# Patient Record
Sex: MALE | Race: WHITE | NOT HISPANIC OR LATINO
[De-identification: names, ages, dates, MRNs, and addresses within clinical notes are randomized per-mention and may not be internally consistent; named-entity substitution may affect disease eponyms.]

---

## 2017-03-20 ENCOUNTER — APPOINTMENT (OUTPATIENT)
Dept: INTERNAL MEDICINE | Facility: CLINIC | Age: 77
End: 2017-03-20

## 2017-08-31 ENCOUNTER — OTHER (OUTPATIENT)
Age: 77
End: 2017-08-31

## 2017-09-05 ENCOUNTER — OTHER (OUTPATIENT)
Age: 77
End: 2017-09-05

## 2017-09-11 ENCOUNTER — APPOINTMENT (OUTPATIENT)
Dept: INTERNAL MEDICINE | Facility: CLINIC | Age: 77
End: 2017-09-11
Payer: MEDICARE

## 2017-09-11 VITALS
SYSTOLIC BLOOD PRESSURE: 130 MMHG | WEIGHT: 146 LBS | DIASTOLIC BLOOD PRESSURE: 76 MMHG | TEMPERATURE: 97.9 F | HEART RATE: 64 BPM | HEIGHT: 68 IN | BODY MASS INDEX: 22.13 KG/M2 | OXYGEN SATURATION: 95 % | RESPIRATION RATE: 18 BRPM

## 2017-09-11 PROCEDURE — 94729 DIFFUSING CAPACITY: CPT

## 2017-09-11 PROCEDURE — 94060 EVALUATION OF WHEEZING: CPT

## 2017-09-11 PROCEDURE — 99214 OFFICE O/P EST MOD 30 MIN: CPT | Mod: 25

## 2017-09-11 PROCEDURE — 94727 GAS DIL/WSHOT DETER LNG VOL: CPT

## 2017-12-05 LAB
CHOLEST SERPL-MCNC: 145
CHOLEST SERPL-MCNC: 145
GLUCOSE SERPL-MCNC: 86
GLUCOSE SERPL-MCNC: 86
HBA1C MFR BLD HPLC: 5.4
HBA1C MFR BLD HPLC: 5.4
HDLC SERPL-MCNC: 74
HDLC SERPL-MCNC: 74
LDLC SERPL CALC-MCNC: 63
LDLC SERPL CALC-MCNC: 63
PSA SERPL-MCNC: 0.86

## 2018-03-19 ENCOUNTER — APPOINTMENT (OUTPATIENT)
Dept: INTERNAL MEDICINE | Facility: CLINIC | Age: 78
End: 2018-03-19
Payer: MEDICARE

## 2018-03-19 VITALS
WEIGHT: 145 LBS | RESPIRATION RATE: 18 BRPM | OXYGEN SATURATION: 98 % | HEIGHT: 68 IN | HEART RATE: 70 BPM | BODY MASS INDEX: 21.98 KG/M2 | SYSTOLIC BLOOD PRESSURE: 130 MMHG | DIASTOLIC BLOOD PRESSURE: 78 MMHG | TEMPERATURE: 98.3 F

## 2018-03-19 PROCEDURE — 94729 DIFFUSING CAPACITY: CPT

## 2018-03-19 PROCEDURE — 99214 OFFICE O/P EST MOD 30 MIN: CPT | Mod: 25

## 2018-03-19 PROCEDURE — 94060 EVALUATION OF WHEEZING: CPT

## 2018-04-02 ENCOUNTER — MEDICATION RENEWAL (OUTPATIENT)
Age: 78
End: 2018-04-02

## 2018-04-02 ENCOUNTER — RX RENEWAL (OUTPATIENT)
Age: 78
End: 2018-04-02

## 2018-09-09 ENCOUNTER — RESULT CHARGE (OUTPATIENT)
Age: 78
End: 2018-09-09

## 2018-09-10 ENCOUNTER — LABORATORY RESULT (OUTPATIENT)
Age: 78
End: 2018-09-10

## 2018-09-10 ENCOUNTER — NON-APPOINTMENT (OUTPATIENT)
Age: 78
End: 2018-09-10

## 2018-09-10 ENCOUNTER — APPOINTMENT (OUTPATIENT)
Dept: INTERNAL MEDICINE | Facility: CLINIC | Age: 78
End: 2018-09-10
Payer: MEDICARE

## 2018-09-10 VITALS
HEART RATE: 60 BPM | RESPIRATION RATE: 18 BRPM | TEMPERATURE: 98.2 F | HEIGHT: 68 IN | SYSTOLIC BLOOD PRESSURE: 146 MMHG | BODY MASS INDEX: 21.22 KG/M2 | DIASTOLIC BLOOD PRESSURE: 70 MMHG | OXYGEN SATURATION: 95 % | WEIGHT: 139.99 LBS

## 2018-09-10 DIAGNOSIS — F52.21 MALE ERECTILE DISORDER: ICD-10-CM

## 2018-09-10 DIAGNOSIS — Z87.891 PERSONAL HISTORY OF NICOTINE DEPENDENCE: ICD-10-CM

## 2018-09-10 DIAGNOSIS — Z78.9 OTHER SPECIFIED HEALTH STATUS: ICD-10-CM

## 2018-09-10 DIAGNOSIS — Q82.5 CONGENITAL NON-NEOPLASTIC NEVUS: ICD-10-CM

## 2018-09-10 DIAGNOSIS — R26.9 UNSPECIFIED ABNORMALITIES OF GAIT AND MOBILITY: ICD-10-CM

## 2018-09-10 DIAGNOSIS — E72.19 OTHER DISORDERS OF SULFUR-BEARING AMINO-ACID METABOLISM: ICD-10-CM

## 2018-09-10 DIAGNOSIS — Z86.19 PERSONAL HISTORY OF OTHER INFECTIOUS AND PARASITIC DISEASES: ICD-10-CM

## 2018-09-10 DIAGNOSIS — K44.9 DIAPHRAGMATIC HERNIA W/OUT OBSTRUCTION OR GANGRENE: ICD-10-CM

## 2018-09-10 DIAGNOSIS — Z87.19 PERSONAL HISTORY OF OTHER DISEASES OF THE DIGESTIVE SYSTEM: ICD-10-CM

## 2018-09-10 DIAGNOSIS — B44.89 OTHER FORMS OF ASPERGILLOSIS: ICD-10-CM

## 2018-09-10 DIAGNOSIS — Z80.1 FAMILY HISTORY OF MALIGNANT NEOPLASM OF TRACHEA, BRONCHUS AND LUNG: ICD-10-CM

## 2018-09-10 DIAGNOSIS — E03.9 HYPOTHYROIDISM, UNSPECIFIED: ICD-10-CM

## 2018-09-10 DIAGNOSIS — R42 DIZZINESS AND GIDDINESS: ICD-10-CM

## 2018-09-10 PROCEDURE — 94010 BREATHING CAPACITY TEST: CPT

## 2018-09-10 PROCEDURE — 93000 ELECTROCARDIOGRAM COMPLETE: CPT

## 2018-09-10 PROCEDURE — 99214 OFFICE O/P EST MOD 30 MIN: CPT | Mod: 25

## 2018-09-10 RX ORDER — CHOLECALCIFEROL (VITAMIN D3) 50 MCG
50 MCG CAPSULE ORAL
Refills: 0 | Status: ACTIVE | COMMUNITY

## 2018-09-10 RX ORDER — GUAIFENESIN 600 MG/1
600 TABLET, EXTENDED RELEASE ORAL
Refills: 11 | Status: COMPLETED | COMMUNITY
Start: 1900-01-01 | End: 2019-09-05

## 2018-09-10 NOTE — HEALTH RISK ASSESSMENT
[No falls in past year] : Patient reported no falls in the past year [0] : 1) Little interest or pleasure doing things: Not at all (0) [1] : 2) Feeling down, depressed, or hopeless for several days (1) [[id number]] : 1

## 2018-09-10 NOTE — PLAN
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 He must record pulse with BP when checked.\par To ER for serious sxs.\par F/U here in 3 weeks or sooner PRN.\par

## 2018-09-10 NOTE — REVIEW OF SYSTEMS
[Fever] : no fever [Chills] : no chills [Fatigue] : fatigue [Hot Flashes] : no hot flashes [Night Sweats] : no night sweats [Recent Change In Weight] : ~T recent weight change [Discharge] : no discharge [Pain] : no pain [Vision Problems] : no vision problems [Nosebleeds] : no nosebleeds [Postnasal Drip] : no postnasal drip [Nasal Discharge] : no nasal discharge [Sore Throat] : no sore throat [Hoarseness] : no hoarseness [Chest Pain] : no chest pain [Palpitations] : palpitations [Claudication] : no  leg claudication [Lower Ext Edema] : no lower extremity edema [Orthopena] : no orthopnea [Paroysmal Nocturnal Dyspnea] : no paroysmal nocturnal dyspnea [Shortness Of Breath] : no shortness of breath [Wheezing] : no wheezing [Cough] : cough [Dyspnea on Exertion] : dyspnea on exertion [Abdominal Pain] : no abdominal pain [Nausea] : no nausea [Constipation] : no constipation [Diarrhea] : no diarrhea [Heartburn] : no heartburn [Melena] : no melena [Dysuria] : no dysuria [Incontinence] : no incontinence [Hematuria] : no hematuria [Joint Pain] : no joint pain [Joint Stiffness] : joint stiffness [Muscle Pain] : no muscle pain [Back Pain] : no back pain [Joint Swelling] : no joint swelling [Itching] : no itching [Skin Rash] : no skin rash [Headache] : no headache [Dizziness] : no dizziness [Fainting] : no fainting [Confusion] : no confusion [Unsteady Walk] : ataxia [Memory Loss] : no memory loss [Suicidal] : not suicidal [Anxiety] : no anxiety [Depression] : no depression [Easy Bleeding] : easy bleeding [Swollen Glands] : no swollen glands [Negative] : Heme/Lymph

## 2018-09-10 NOTE — COUNSELING
[Weight management counseling provided] : Weight management [Healthy eating counseling provided] : healthy eating [Activity counseling provided] : activity [Walking] : Walking [Good understanding] : Patient has a good understanding of lifestyle changes and the steps needed to achieve self management goals [de-identified] : Fall precautions reviewed.

## 2018-09-10 NOTE — HISTORY OF PRESENT ILLNESS
[FreeTextEntry8] : "I've been off balance."\par \par The patient denies HX of vertigo but has HX of orthostatic LH and reports 2 episodes of being "off balance" for 3 day period. He awoke in the AM of 8-4-18 and "had trouble finding the floor." There was no room spinning dizziness but was imbalanced and having difficulty walking straight. "I had to hold the wall to walk." This was present for 3 days with variable intensity and resolved spontaneously on day 3. He was unable to drive to work on the first day. BP ranged from 103/53 to 115/62 over 3 days but he did not record pulse rate. Sxs resolved without intervention but returned from 9-1 to 9-3-18. This time sxs began in the PM on 9-1-18 and associated with mild fatigue. He has not fallen and there was no syncope. BP reported as 105/63 to 124/79. \par Vision was not changed and he denies having chest pain, palpitation, abd pain, nausea, diarrhea, emesis, melena or BRBPR. He is unclear if there were palpitations but nothing sustained. He had mild cough and stable NICHOLS during episodes. There is no worsening LE edema, orthopnea or PND. He did not have fever, chills or rash. He lost 5 pounds since last visit but "This is normal for me in the summer." He states he is compliant with cardiology and ENT surgical follow up.

## 2018-09-10 NOTE — PHYSICAL EXAM
[No Acute Distress] : no acute distress [Well Developed] : well developed [Normal Sclera/Conjunctiva] : normal sclera/conjunctiva [PERRL] : pupils equal round and reactive to light [EOMI] : extraocular movements intact [Normal Outer Ear/Nose] : the outer ears and nose were normal in appearance [Normal Oropharynx] : the oropharynx was normal [Normal TMs] : both tympanic membranes were normal [Normal Nasal Mucosa] : the nasal mucosa was normal [No JVD] : no jugular venous distention [Supple] : supple [No Lymphadenopathy] : no lymphadenopathy [Thyroid Normal, No Nodules] : the thyroid was normal and there were no nodules present [No Respiratory Distress] : no respiratory distress  [No Accessory Muscle Use] : no accessory muscle use [Normal Rate] : normal rate  [Regular Rhythm] : with a regular rhythm [Normal S1, S2] : normal S1 and S2 [No Carotid Bruits] : no carotid bruits [No Varicosities] : no varicosities [No Edema] : there was no peripheral edema [No Extremity Clubbing/Cyanosis] : no extremity clubbing/cyanosis [Soft] : abdomen soft [Non Tender] : non-tender [No HSM] : no HSM [Normal Bowel Sounds] : normal bowel sounds [Normal Supraclavicular Nodes] : no supraclavicular lymphadenopathy [Normal Anterior Cervical Nodes] : no anterior cervical lymphadenopathy [No Spinal Tenderness] : no spinal tenderness [Kyphosis] : kyphosis [Scoliosis] : no scoliosis [No Joint Swelling] : no joint swelling [Grossly Normal Strength/Tone] : grossly normal strength/tone [No Rash] : no rash [Normal Gait] : normal gait [Coordination Grossly Intact] : coordination grossly intact [No Focal Deficits] : no focal deficits [Speech Grossly Normal] : speech grossly normal [Memory Grossly Normal] : memory grossly normal [Normal Affect] : the affect was normal [Alert and Oriented x3] : oriented to person, place, and time [Normal Mood] : the mood was normal [Normal Insight/Judgement] : insight and judgment were intact [de-identified] : Hoarse voice, thin and chronically ill appearing. [de-identified] : non obstructing cerumen right AC [de-identified] : no stridor or mass. [de-identified] : diffusely diminished and hyperresonant. No wheeze or crackles. [de-identified] : 1/6 SAUMYA and occ extrasystole [de-identified] : multiple ecchymoses

## 2018-09-10 NOTE — DATA REVIEWED
[FreeTextEntry1] : EKG is performed. NSR at 61 bpm, normal axis, IVCD, micro Q in III and AVF, good R wave progression V1-6 with diffuse J point ST segments. No change c/w 11-9-15 EKG.\par \par A pre-and post spirogram was performed today. This reveals mild restriction with severe obstruction and mild airway reactivity.\par

## 2018-09-11 ENCOUNTER — RESULT REVIEW (OUTPATIENT)
Age: 78
End: 2018-09-11

## 2018-09-11 ENCOUNTER — CHART COPY (OUTPATIENT)
Age: 78
End: 2018-09-11

## 2018-10-01 ENCOUNTER — APPOINTMENT (OUTPATIENT)
Dept: INTERNAL MEDICINE | Facility: CLINIC | Age: 78
End: 2018-10-01
Payer: MEDICARE

## 2018-10-01 ENCOUNTER — NON-APPOINTMENT (OUTPATIENT)
Age: 78
End: 2018-10-01

## 2018-10-01 VITALS
OXYGEN SATURATION: 95 % | RESPIRATION RATE: 16 BRPM | WEIGHT: 142 LBS | HEIGHT: 68 IN | TEMPERATURE: 98.2 F | DIASTOLIC BLOOD PRESSURE: 80 MMHG | BODY MASS INDEX: 21.52 KG/M2 | SYSTOLIC BLOOD PRESSURE: 132 MMHG | HEART RATE: 70 BPM

## 2018-10-01 PROCEDURE — 99214 OFFICE O/P EST MOD 30 MIN: CPT | Mod: 25

## 2018-10-01 PROCEDURE — 94060 EVALUATION OF WHEEZING: CPT

## 2018-10-17 ENCOUNTER — RESULT REVIEW (OUTPATIENT)
Age: 78
End: 2018-10-17

## 2019-04-08 ENCOUNTER — APPOINTMENT (OUTPATIENT)
Dept: INTERNAL MEDICINE | Facility: CLINIC | Age: 79
End: 2019-04-08

## 2019-04-10 ENCOUNTER — MEDICATION RENEWAL (OUTPATIENT)
Age: 79
End: 2019-04-10

## 2019-07-10 ENCOUNTER — APPOINTMENT (OUTPATIENT)
Dept: INTERNAL MEDICINE | Facility: CLINIC | Age: 79
End: 2019-07-10
Payer: MEDICARE

## 2019-07-10 ENCOUNTER — NON-APPOINTMENT (OUTPATIENT)
Age: 79
End: 2019-07-10

## 2019-07-10 VITALS
DIASTOLIC BLOOD PRESSURE: 70 MMHG | BODY MASS INDEX: 21.22 KG/M2 | OXYGEN SATURATION: 96 % | HEIGHT: 68 IN | RESPIRATION RATE: 18 BRPM | TEMPERATURE: 98.1 F | HEART RATE: 60 BPM | WEIGHT: 140 LBS | SYSTOLIC BLOOD PRESSURE: 140 MMHG

## 2019-07-10 DIAGNOSIS — K22.70 BARRETT'S ESOPHAGUS W/OUT DYSPLASIA: ICD-10-CM

## 2019-07-10 DIAGNOSIS — D63.8 ANEMIA IN OTHER CHRONIC DISEASES CLASSIFIED ELSEWHERE: ICD-10-CM

## 2019-07-10 PROCEDURE — 99215 OFFICE O/P EST HI 40 MIN: CPT | Mod: 25

## 2019-07-10 PROCEDURE — ZZZZZ: CPT

## 2019-07-10 PROCEDURE — 94729 DIFFUSING CAPACITY: CPT

## 2019-07-10 PROCEDURE — 94060 EVALUATION OF WHEEZING: CPT

## 2019-07-10 PROCEDURE — 93000 ELECTROCARDIOGRAM COMPLETE: CPT

## 2019-07-10 PROCEDURE — 94727 GAS DIL/WSHOT DETER LNG VOL: CPT

## 2019-08-01 NOTE — DATA REVIEWED
[FreeTextEntry1] : A pulmonary function test is performed. Lung volumes including the total lung capacity, vital capacity, and residual volume are all moderately reduced. The FRC is mildly reduced. Lung mechanics reveal a significant decrease in flow rates with minimal bronchodilator reactivity demonstrated. The DLCO is mildly reduced at 61%. Saturation is 96%. This represents a significant degree of obstructive lung disease. The constellation of the marked reduction in flow rates, the minimal degree of bronchodilator reactivity, and a decrease in the DLCO, is consistent with anatomic emphysema. Saturation is maintained.\par \par EKG shows sinus rhythm at a rate of 71. There are normal intervals and axis. There were no acute ST-T wave changes noted.\par \par Blood work from 7/31/19 is reviewed.

## 2019-08-01 NOTE — PHYSICAL EXAM
[No Acute Distress] : no acute distress [Well Developed] : well developed [Normal Sclera/Conjunctiva] : normal sclera/conjunctiva [PERRL] : pupils equal round and reactive to light [Normal Oropharynx] : the oropharynx was normal [No JVD] : no jugular venous distention [Supple] : supple [Thyroid Normal, No Nodules] : the thyroid was normal and there were no nodules present [No Respiratory Distress] : no respiratory distress  [Clear to Auscultation] : lungs were clear to auscultation bilaterally [No Accessory Muscle Use] : no accessory muscle use [Normal Rate] : normal rate  [Normal S1, S2] : normal S1 and S2 [Regular Rhythm] : with a regular rhythm [No Edema] : there was no peripheral edema [No Extremity Clubbing/Cyanosis] : no extremity clubbing/cyanosis [Soft] : abdomen soft [Non Tender] : non-tender [Normal Bowel Sounds] : normal bowel sounds [No HSM] : no HSM [Normal Supraclavicular Nodes] : no supraclavicular lymphadenopathy [Normal Posterior Cervical Nodes] : no posterior cervical lymphadenopathy [Normal Anterior Cervical Nodes] : no anterior cervical lymphadenopathy [Kyphosis] : kyphosis [No Spinal Tenderness] : no spinal tenderness [No Joint Swelling] : no joint swelling [Grossly Normal Strength/Tone] : grossly normal strength/tone [No Rash] : no rash [Normal Gait] : normal gait [Coordination Grossly Intact] : coordination grossly intact [No Focal Deficits] : no focal deficits [Normal Affect] : the affect was normal [Alert and Oriented x3] : oriented to person, place, and time [Normal Insight/Judgement] : insight and judgment were intact [Scoliosis] : no scoliosis [de-identified] : non obstructing cerumen right AC [de-identified] : Hoarse voice, thin and chronically ill appearing. [de-identified] : diffusely diminished and hyperresonant. No wheeze or crackles. [de-identified] : no stridor or mass. [de-identified] : 1/6 SAUMYA and occ extrasystole [de-identified] : multiple ecchymoses

## 2019-08-01 NOTE — PLAN
[FreeTextEntry1] : 1. Continued medication as outlined above.\par \par 2. Routine fasting blood work to be performed.\par \par 3. The patient will undergo a repeat CAT scan of the chest at this time which will be compared to prior studies. Of note is the fact that the patient did undergo a right upper lobectomy for a benign process. His postoperative course was complicated by a protracted air leak.\par \par 4. The patient has been referred back to Dr. Ortiz for a cardiac reevaluation as well as for the purpose of performing carotid duplex Doppler studies.\par \par 5. Urologic followup with Dr. Johnson next week.\par \par 6. The patient will be pretreated with prednisone which she will take 40 mg one day prior to his colonoscopy and endoscopy, and 20 mg on the day of the procedure.\par \par 7. Follow up with myself in 6 months with a wellness evaluation.\par \par Addendum:  Blood work has been reviewed. There are no absolute contraindications to the preplanned procedure.

## 2019-08-01 NOTE — HISTORY OF PRESENT ILLNESS
[de-identified] : The patient comes in today for a preoperative medical clearance and followup evaluation.\par \par Patient states that he has been feeling fairly well recently. He does have significant advanced obstructive lung disease. He has been maintained on a regimen of Spiriva and Pulmicort with fairly good results clinically. She remains active. He denies any cough or hemoptysis. He does usually produce sputum on a daily basis, for which he does take Mucinex to help thin secretions.\par \par The patient states that he is compliant with his omeprazole on a daily basis. He is scheduled to undergo an upper endoscopy to reevaluate his known history of Henderson's esophagus. His appetite has been good. There has been no change in bowel habits. He denies chest pains. There are no fevers chills or night sweats. He now comes in for this assessment.

## 2019-08-12 ENCOUNTER — MEDICATION RENEWAL (OUTPATIENT)
Age: 79
End: 2019-08-12

## 2019-08-12 RX ORDER — ALBUTEROL SULFATE 4 MG/1
4 TABLET, FILM COATED, EXTENDED RELEASE ORAL
Qty: 180 | Refills: 3 | Status: DISCONTINUED | COMMUNITY
End: 2019-08-12

## 2019-12-26 DIAGNOSIS — I65.23 OCCLUSION AND STENOSIS OF BILATERAL CAROTID ARTERIES: ICD-10-CM

## 2020-01-31 ENCOUNTER — APPOINTMENT (OUTPATIENT)
Dept: INTERNAL MEDICINE | Facility: CLINIC | Age: 80
End: 2020-01-31
Payer: MEDICARE

## 2020-01-31 VITALS
WEIGHT: 144 LBS | SYSTOLIC BLOOD PRESSURE: 130 MMHG | BODY MASS INDEX: 21.82 KG/M2 | OXYGEN SATURATION: 95 % | HEART RATE: 72 BPM | RESPIRATION RATE: 16 BRPM | DIASTOLIC BLOOD PRESSURE: 70 MMHG | HEIGHT: 68 IN | TEMPERATURE: 97.7 F

## 2020-01-31 PROCEDURE — G0438: CPT

## 2020-01-31 NOTE — HISTORY OF PRESENT ILLNESS
[de-identified] : This patient comes in today for a wellness evaluation.\par \par Overall, the patient states that he is doing well. He has a history of severe COPD. He remains compliant with his bronchodilator regimen for management. He does not follow a formal exercise regimen. His last CT scan of his chest was performed in July 2019. The results indicated severe pulmonary emphysema, bullous disease, and postoperative and pleural changes. These results are not different compared to the prior studies. He continues to occasionally produce clear sputum in the mornings. He uses mucinex if he feels the mucous is too thick. He denies any recent COPD exacerbations, wheezing, SOB, purulent nasal secretions.\par \par The patient has a history of HLD. He remains compliant with his atorvastatin for management. He is followed by Dr. Ortiz. He had a echo, stress test, and carotid duplex study performed this past November. He denies CP, tightness, and palpitations.\par \par He has GERD. He remains compliant with his omeprazole for management. He denies changes in his diet, appetite, and bowel habits.\par \par He is followed by Dr. Perrin one a yearly basis due to his history of laryngeal cancer. He has been treated with radiation therapy in the past.\par \par He denies fevers, chills, night sweats, and any other constitutional symptoms. He now comes in for this assessment.

## 2020-01-31 NOTE — END OF VISIT
[FreeTextEntry3] : All medical record entries made by the scribe were at my, Dr. Lion Rodriguez, direction and personally dictated by me on 01/31/2020. I have reviewed the chart and agree that the record accurately reflects my personal performance of the history, physical exam, assessment and plan. I have also personally directed, reviewed, and agreed with the chart.

## 2020-01-31 NOTE — ADDENDUM
[FreeTextEntry1] : I, Ronaldo Alston, acted solely as a scribe for Dr. Lion Rodriguez on this date 01/31/2020.

## 2020-01-31 NOTE — HEALTH RISK ASSESSMENT
[Yes] : Yes [No] : In the past 12 months have you used drugs other than those required for medical reasons? No [0] : 2) Feeling down, depressed, or hopeless: Not at all (0) [Smoke Detector] : smoke detector [Carbon Monoxide Detector] : carbon monoxide detector [Seat Belt] :  uses seat belt [Sunscreen] : uses sunscreen [Good] : ~his/her~  mood as  good [Monthly or less (1 pt)] : Monthly or less (1 point) [1 or 2 (0 pts)] : 1 or 2 (0 points) [Never (0 pts)] : Never (0 points) [Sexually Active] : sexually active [Feels Safe at Home] : Feels safe at home [Fully functional (bathing, dressing, toileting, transferring, walking, feeding)] : Fully functional (bathing, dressing, toileting, transferring, walking, feeding) [Fully functional (using the telephone, shopping, preparing meals, housekeeping, doing laundry, using] : Fully functional and needs no help or supervision to perform IADLs (using the telephone, shopping, preparing meals, housekeeping, doing laundry, using transportation, managing medications and managing finances) [] : No [EyeExamDate] : 01/98 [de-identified] : occasional  [ColonoscopyDate] : 01/19 [BoneDensityDate] : 01/19 [Guns at Home] : no guns at home

## 2020-01-31 NOTE — PHYSICAL EXAM
[No Acute Distress] : no acute distress [Well Developed] : well developed [PERRL] : pupils equal round and reactive to light [Normal Sclera/Conjunctiva] : normal sclera/conjunctiva [No JVD] : no jugular venous distention [Normal Oropharynx] : the oropharynx was normal [Supple] : supple [Thyroid Normal, No Nodules] : the thyroid was normal and there were no nodules present [No Respiratory Distress] : no respiratory distress  [Clear to Auscultation] : lungs were clear to auscultation bilaterally [No Accessory Muscle Use] : no accessory muscle use [Normal Rate] : normal rate  [Regular Rhythm] : with a regular rhythm [Normal S1, S2] : normal S1 and S2 [No Edema] : there was no peripheral edema [No Extremity Clubbing/Cyanosis] : no extremity clubbing/cyanosis [Soft] : abdomen soft [Non Tender] : non-tender [No HSM] : no HSM [Normal Supraclavicular Nodes] : no supraclavicular lymphadenopathy [Normal Bowel Sounds] : normal bowel sounds [Normal Posterior Cervical Nodes] : no posterior cervical lymphadenopathy [No Spinal Tenderness] : no spinal tenderness [Normal Anterior Cervical Nodes] : no anterior cervical lymphadenopathy [Kyphosis] : kyphosis [No Joint Swelling] : no joint swelling [No Rash] : no rash [Grossly Normal Strength/Tone] : grossly normal strength/tone [No Focal Deficits] : no focal deficits [Normal Gait] : normal gait [Coordination Grossly Intact] : coordination grossly intact [Alert and Oriented x3] : oriented to person, place, and time [Normal Affect] : the affect was normal [Normal Insight/Judgement] : insight and judgment were intact [Scoliosis] : no scoliosis [de-identified] : non obstructing cerumen right AC [de-identified] : Hoarse voice, thin and chronically ill appearing. [de-identified] : diffusely diminished and hyperresonant. No wheeze or crackles. [de-identified] : no stridor or mass. [de-identified] : multiple ecchymoses [de-identified] : 1/6 SAUMYA and occ extrasystole

## 2020-07-20 ENCOUNTER — RX RENEWAL (OUTPATIENT)
Age: 80
End: 2020-07-20

## 2020-08-16 LAB — SARS-COV-2 N GENE NPH QL NAA+PROBE: NOT DETECTED

## 2020-08-20 ENCOUNTER — APPOINTMENT (OUTPATIENT)
Dept: INTERNAL MEDICINE | Facility: CLINIC | Age: 80
End: 2020-08-20
Payer: MEDICARE

## 2020-08-20 ENCOUNTER — NON-APPOINTMENT (OUTPATIENT)
Age: 80
End: 2020-08-20

## 2020-08-20 VITALS
HEART RATE: 68 BPM | BODY MASS INDEX: 20.92 KG/M2 | OXYGEN SATURATION: 96 % | TEMPERATURE: 97.7 F | DIASTOLIC BLOOD PRESSURE: 74 MMHG | SYSTOLIC BLOOD PRESSURE: 153 MMHG | WEIGHT: 138 LBS | HEIGHT: 68 IN | RESPIRATION RATE: 18 BRPM

## 2020-08-20 PROCEDURE — 90715 TDAP VACCINE 7 YRS/> IM: CPT | Mod: GY

## 2020-08-20 PROCEDURE — 90471 IMMUNIZATION ADMIN: CPT

## 2020-08-20 PROCEDURE — 93000 ELECTROCARDIOGRAM COMPLETE: CPT

## 2020-08-20 PROCEDURE — 94010 BREATHING CAPACITY TEST: CPT

## 2020-08-20 PROCEDURE — 99214 OFFICE O/P EST MOD 30 MIN: CPT | Mod: 25

## 2020-08-20 PROCEDURE — 94729 DIFFUSING CAPACITY: CPT

## 2020-08-20 PROCEDURE — 94727 GAS DIL/WSHOT DETER LNG VOL: CPT

## 2020-08-20 NOTE — PHYSICAL EXAM
[No Acute Distress] : no acute distress [Well Developed] : well developed [Normal Sclera/Conjunctiva] : normal sclera/conjunctiva [No JVD] : no jugular venous distention [Supple] : supple [No Respiratory Distress] : no respiratory distress  [Clear to Auscultation] : lungs were clear to auscultation bilaterally [No Accessory Muscle Use] : no accessory muscle use [Normal Rate] : normal rate  [Regular Rhythm] : with a regular rhythm [Normal S1, S2] : normal S1 and S2 [No Edema] : there was no peripheral edema [No Extremity Clubbing/Cyanosis] : no extremity clubbing/cyanosis [Soft] : abdomen soft [Non Tender] : non-tender [No HSM] : no HSM [Normal Bowel Sounds] : normal bowel sounds [Normal Supraclavicular Nodes] : no supraclavicular lymphadenopathy [Normal Posterior Cervical Nodes] : no posterior cervical lymphadenopathy [Normal Anterior Cervical Nodes] : no anterior cervical lymphadenopathy [No Spinal Tenderness] : no spinal tenderness [Kyphosis] : kyphosis [Scoliosis] : no scoliosis [No Rash] : no rash [Normal Affect] : the affect was normal [Alert and Oriented x3] : oriented to person, place, and time [Normal Insight/Judgement] : insight and judgment were intact [de-identified] : Hoarse voice, thin and chronically ill appearing. [de-identified] : no stridor or mass. [de-identified] : diffusely diminished and hyperresonant. No wheeze or crackles. [de-identified] : 1/6 SAUMYA and occ extrasystole [de-identified] : multiple ecchymoses

## 2020-08-20 NOTE — DATA REVIEWED
[FreeTextEntry1] : A pulmonary function test is performed. Lung volumes reveal a normal total lung capacity, residual volume, and FRC. The vital capacity is reduced. Lung mechanics reveal a significant decrease in flow rates. Bronchodilator reactivity is not assessed. The DLCO saturation maintained. This represents a significant degree of obstruction.\par \par EKG shows sinus bradycardia at a rate of 57. There are normal intervals and axis. There are no acute ST-T wave changes noted.\par \par Yearly blood work is reviewed

## 2020-08-20 NOTE — HISTORY OF PRESENT ILLNESS
[de-identified] : The patient comes in today for a followup evaluation and reassessment. There are several issues to discuss.\par \par Overall, the patient states that he is doing well at his time. He notes that typically, his breathlessness worsens slightly during the summer months. This has happened again this year. Overall, there is no significant change however. He remains compliant with his bronchodilator regimen of Spiriva, Pulmicort, and albuterol tablets. Occasionally he produces scant amounts of sputum. He denies any chest pain or pleuritic pain.\par \par The patient notes having occasional palpitations. He follows up with his cardiologist, Dr. Ortiz, on a regular basis. His last evaluation was in November of 2019, including a cardiac PET/CT scan.\par \par With regards to his history of laryngeal carcinoma, he no longer follows up with his ENT physician. He remains hoarse. His appetite is good. He has lost several pounds over the past few months, but he is not concerned because he usually regains it during the winter months. He now comes in for assessment

## 2020-08-20 NOTE — PLAN
[FreeTextEntry1] : 1. Continue with medication as outlined above.\par \par 2. Flu shot in the fall.\par \par 3. TDaP has been a .\par \par 4. Followup in 6 months with a wellness evaluation.\par \par 5. Routine cardiology followup with Dr. Enriquez in.\par \par 6. Repeat CAT scan of the chest to be performed in July of 2021 due to his history of bullous emphysema.

## 2021-01-04 RX ORDER — TIOTROPIUM BROMIDE 18 UG/1
18 CAPSULE ORAL; RESPIRATORY (INHALATION) DAILY
Qty: 90 | Refills: 3 | Status: DISCONTINUED | COMMUNITY
End: 2021-01-04

## 2021-01-05 ENCOUNTER — NON-APPOINTMENT (OUTPATIENT)
Age: 81
End: 2021-01-05

## 2021-03-01 ENCOUNTER — NON-APPOINTMENT (OUTPATIENT)
Age: 81
End: 2021-03-01

## 2021-03-11 ENCOUNTER — APPOINTMENT (OUTPATIENT)
Dept: INTERNAL MEDICINE | Facility: CLINIC | Age: 81
End: 2021-03-11
Payer: MEDICARE

## 2021-03-11 VITALS
BODY MASS INDEX: 21.52 KG/M2 | RESPIRATION RATE: 18 BRPM | WEIGHT: 142 LBS | HEIGHT: 68 IN | OXYGEN SATURATION: 92 % | HEART RATE: 79 BPM | TEMPERATURE: 98 F | DIASTOLIC BLOOD PRESSURE: 74 MMHG | SYSTOLIC BLOOD PRESSURE: 170 MMHG

## 2021-03-11 PROCEDURE — 99213 OFFICE O/P EST LOW 20 MIN: CPT | Mod: 25

## 2021-03-11 PROCEDURE — G0439: CPT

## 2021-03-11 NOTE — HISTORY OF PRESENT ILLNESS
[FreeTextEntry1] : The patient comes in for a yearly wellness evaluation\par  [de-identified] : The patient states, overall, he is relatively stable. He has been compliant with his medications. He does note having shortness of breath with moderate amounts of exertion. He denies any cough or sputum production.\par \par The patient states that he always has abnormal urinalyses, with white blood cells. This has been evaluated in the past. He denies dysuria, urinary frequency, or urgency.\par \par Lastly, the patient notes having a slight discomfort and possible lump in the right inguinal region. He states that it comes and goes. At times it is slightly painful but the pain usually resolves after one hour of rest. He has never had a hernia that he is aware of in the past. He denies any recent heavy lifting. He denies any other acute constitutional symptoms. He now comes in for this assessment.

## 2021-03-11 NOTE — REVIEW OF SYSTEMS
[Abdominal Pain] : abdominal pain [Negative] : Heme/Lymph [FreeTextEntry6] : see history of present illness [FreeTextEntry7] : see history of present illness

## 2021-03-11 NOTE — PLAN
[FreeTextEntry1] : 1. Continued medication as outlined above.\par \par 2. The patient will now monitor his blood pressure carefully at home. He states that he has a component of white coat syndrome. If his systolic pressure remains greater than 140, he will contact us immediately. He will be reevaluated.\par \par 3. the patient will undergo a repeat CAT scan of the chest in August.\par \par 4. 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 The patient does not want to undergo a surgical consultation at this time for possible hernia. He would like to observe. If symptoms worsen or become more prominent, he will then consider surgical consultation.\par \par 6. Follow up with myself in 6 months with full pulmonary function testing and EKG

## 2021-03-11 NOTE — PHYSICAL EXAM
[No Acute Distress] : no acute distress [Well Developed] : well developed [Normal Sclera/Conjunctiva] : normal sclera/conjunctiva [PERRL] : pupils equal round and reactive to light [Normal Oropharynx] : the oropharynx was normal [No JVD] : no jugular venous distention [Supple] : supple [Thyroid Normal, No Nodules] : the thyroid was normal and there were no nodules present [No Respiratory Distress] : no respiratory distress  [Clear to Auscultation] : lungs were clear to auscultation bilaterally [No Accessory Muscle Use] : no accessory muscle use [Normal Rate] : normal rate  [Regular Rhythm] : with a regular rhythm [Normal S1, S2] : normal S1 and S2 [No Edema] : there was no peripheral edema [No Extremity Clubbing/Cyanosis] : no extremity clubbing/cyanosis [Soft] : abdomen soft [Non Tender] : non-tender [No HSM] : no HSM [Normal Bowel Sounds] : normal bowel sounds [Normal Supraclavicular Nodes] : no supraclavicular lymphadenopathy [Normal Posterior Cervical Nodes] : no posterior cervical lymphadenopathy [Normal Anterior Cervical Nodes] : no anterior cervical lymphadenopathy [No Spinal Tenderness] : no spinal tenderness [Kyphosis] : kyphosis [Scoliosis] : no scoliosis [No Joint Swelling] : no joint swelling [Grossly Normal Strength/Tone] : grossly normal strength/tone [No Rash] : no rash [Normal Gait] : normal gait [Coordination Grossly Intact] : coordination grossly intact [No Focal Deficits] : no focal deficits [Normal Affect] : the affect was normal [Alert and Oriented x3] : oriented to person, place, and time [Normal Insight/Judgement] : insight and judgment were intact [de-identified] : Hoarse voice, thin and chronically ill appearing. [de-identified] : non obstructing cerumen right AC [de-identified] : no stridor or mass. [de-identified] : diffusely diminished and hyperresonant. No wheeze or crackles. [de-identified] : 1/6 SAUMYA and occ extrasystole [FreeTextEntry1] : as per urology [de-identified] : as per urology [de-identified] : multiple ecchymoses

## 2021-07-09 ENCOUNTER — APPOINTMENT (OUTPATIENT)
Dept: INTERNAL MEDICINE | Facility: CLINIC | Age: 81
End: 2021-07-09
Payer: MEDICARE

## 2021-07-09 VITALS
BODY MASS INDEX: 21.07 KG/M2 | HEIGHT: 68 IN | OXYGEN SATURATION: 95 % | TEMPERATURE: 98.4 F | HEART RATE: 73 BPM | WEIGHT: 139 LBS | DIASTOLIC BLOOD PRESSURE: 70 MMHG | SYSTOLIC BLOOD PRESSURE: 166 MMHG

## 2021-07-09 VITALS — SYSTOLIC BLOOD PRESSURE: 134 MMHG | DIASTOLIC BLOOD PRESSURE: 76 MMHG

## 2021-07-09 DIAGNOSIS — Z23 ENCOUNTER FOR IMMUNIZATION: ICD-10-CM

## 2021-07-09 DIAGNOSIS — Z00.00 ENCOUNTER FOR GENERAL ADULT MEDICAL EXAMINATION W/OUT ABNORMAL FINDINGS: ICD-10-CM

## 2021-07-09 DIAGNOSIS — R82.81 PYURIA: ICD-10-CM

## 2021-07-09 DIAGNOSIS — R09.02 HYPOXEMIA: ICD-10-CM

## 2021-07-09 DIAGNOSIS — R00.2 PALPITATIONS: ICD-10-CM

## 2021-07-09 DIAGNOSIS — K40.90 UNILATERAL INGUINAL HERNIA, W/OUT OBSTRUCTION OR GANGRENE, NOT SPECIFIED AS RECURRENT: ICD-10-CM

## 2021-07-09 DIAGNOSIS — Z87.898 PERSONAL HISTORY OF OTHER SPECIFIED CONDITIONS: ICD-10-CM

## 2021-07-09 PROCEDURE — 99215 OFFICE O/P EST HI 40 MIN: CPT

## 2021-07-09 NOTE — PHYSICAL EXAM
[No Acute Distress] : no acute distress [Well Nourished] : well nourished [Well Developed] : well developed [Normal Oropharynx] : the oropharynx was normal [Supple] : supple [No Respiratory Distress] : no respiratory distress  [No Accessory Muscle Use] : no accessory muscle use [Clear to Auscultation] : lungs were clear to auscultation bilaterally [Normal Rate] : normal rate  [Regular Rhythm] : with a regular rhythm [Normal S1, S2] : normal S1 and S2 [Pedal Pulses Present] : the pedal pulses are present [Soft] : abdomen soft [Non Tender] : non-tender [Non-distended] : non-distended [Normal Bowel Sounds] : normal bowel sounds [Normal Posterior Cervical Nodes] : no posterior cervical lymphadenopathy [Normal Anterior Cervical Nodes] : no anterior cervical lymphadenopathy [Coordination Grossly Intact] : coordination grossly intact [No Focal Deficits] : no focal deficits [Normal Affect] : the affect was normal [Alert and Oriented x3] : oriented to person, place, and time [Normal Insight/Judgement] : insight and judgment were intact

## 2021-07-14 NOTE — RESULTS/DATA
[] : results reviewed [de-identified] : RSR 61, No St / T wave acute changes, reviewed  also  by  Dr. Cox  [de-identified] : LAst PFT 8/2020- pulmonary function test performed. Lung volumes reveal a normal total lung capacity, residual volume and RFC. the vital capacity is reduced. Lung mechanics reveal  a significant decrease in flow rates. Bronchodilator activity is not assessed. The DLCO saturation maintained. \par this represents a significant degree of obstruction.

## 2021-07-14 NOTE — REVIEW OF SYSTEMS
[Dyspnea on Exertion] : dyspnea on exertion [Negative] : Neurological [Fever] : no fever [Chills] : no chills [Fatigue] : no fatigue [FreeTextEntry6] : see HPI

## 2021-07-14 NOTE — ASSESSMENT
[Patient Optimized for Surgery] : Patient optimized for surgery [As per surgery] : as per surgery [FreeTextEntry4] : Advised to hold all vitamins including Motrin , Advil , Aleve,  ASA , supplements, herbals 7 days prior. \par \par COVID PCR per surgery. \par \par Prednisone 40 mg po on 7/25/21 and 7/26/21 , In am  7/27/21 take Prednisone  20 mg po with sip of water Per Dr. Rodriguez. Pt verbalized understanding .  [FreeTextEntry2] : Close airway monitoring and oxygen saturation is required

## 2021-07-14 NOTE — HISTORY OF PRESENT ILLNESS
[COPD] : COPD [(Patient denies any chest pain, claudication, dyspnea on exertion, orthopnea, palpitations or syncope)] : Patient denies any chest pain, claudication, dyspnea on exertion, orthopnea, palpitations or syncope [Aortic Stenosis] : no aortic stenosis [Atrial Fibrillation] : no atrial fibrillation [Coronary Artery Disease] : no coronary artery disease [Recent Myocardial Infarction] : no recent myocardial infarction [Implantable Device/Pacemaker] : no implantable device/pacemaker [Asthma] : no asthma [Sleep Apnea] : no sleep apnea [Smoker] : not a smoker [Family Member] : no family member with adverse anesthesia reaction/sudden death [Self] : no previous adverse anesthesia reaction [Chronic Anticoagulation] : no chronic anticoagulation [Diabetes] : no diabetes [FreeTextEntry1] : right inguinal hernia repair  [FreeTextEntry2] : 7/27/21 [FreeTextEntry3] : Dr. Skelton [FreeTextEntry4] : Pt is a 81 yr. old male with a h/ of severe COPD, GERD, Henderson's esophagus. Pt is very anxious on arrival. He is here for preop evaluation for upcoming elective hernia surgery. \par His pulmonary status has been stable, maintained on Albuterol 4 mg po tabs BID, Pulmicort 180 mcg 2 puffs BID and Incruse Ellipta 62.5 mcg 1 puff daily. Denies cough, wheeze, sputum production or shortness of  breath. \par LAst saw Dr. Ortiz  , cardiology 2019 , stress test 11/2019 negative for ischemia. Echo- normal left ventricular size and systolic function , mild mitral annular calcification, trace mitral regurgitation , trace tricuspid regurgitation, and aortic sclerosis. \par Pt denies fever, chills chest pain, lightheadedness, abdominal pain, N/V. \par  [FreeTextEntry8] : Able to walk 1 flight of steps without difficulty.

## 2021-08-26 ENCOUNTER — OUTPATIENT (OUTPATIENT)
Dept: OUTPATIENT SERVICES | Facility: HOSPITAL | Age: 81
LOS: 1 days | End: 2021-08-26
Payer: MEDICARE

## 2021-08-26 ENCOUNTER — APPOINTMENT (OUTPATIENT)
Dept: CT IMAGING | Facility: CLINIC | Age: 81
End: 2021-08-26
Payer: MEDICARE

## 2021-08-26 DIAGNOSIS — J43.9 EMPHYSEMA, UNSPECIFIED: ICD-10-CM

## 2021-08-26 PROCEDURE — G1004: CPT

## 2021-08-26 PROCEDURE — 71250 CT THORAX DX C-: CPT | Mod: ME

## 2021-08-26 PROCEDURE — 71250 CT THORAX DX C-: CPT | Mod: 26,ME

## 2021-08-30 ENCOUNTER — NON-APPOINTMENT (OUTPATIENT)
Age: 81
End: 2021-08-30

## 2021-09-01 ENCOUNTER — NON-APPOINTMENT (OUTPATIENT)
Age: 81
End: 2021-09-01

## 2021-09-27 LAB — SARS-COV-2 N GENE NPH QL NAA+PROBE: NOT DETECTED

## 2021-09-29 ENCOUNTER — APPOINTMENT (OUTPATIENT)
Dept: INTERNAL MEDICINE | Facility: CLINIC | Age: 81
End: 2021-09-29
Payer: MEDICARE

## 2021-09-29 VITALS
SYSTOLIC BLOOD PRESSURE: 158 MMHG | OXYGEN SATURATION: 96 % | BODY MASS INDEX: 22.18 KG/M2 | HEIGHT: 66 IN | TEMPERATURE: 98.4 F | DIASTOLIC BLOOD PRESSURE: 88 MMHG | RESPIRATION RATE: 18 BRPM | HEART RATE: 70 BPM | WEIGHT: 138 LBS

## 2021-09-29 PROCEDURE — ZZZZZ: CPT

## 2021-09-29 PROCEDURE — 94727 GAS DIL/WSHOT DETER LNG VOL: CPT

## 2021-09-29 PROCEDURE — 94729 DIFFUSING CAPACITY: CPT

## 2021-09-29 PROCEDURE — 99214 OFFICE O/P EST MOD 30 MIN: CPT | Mod: 25

## 2021-09-29 PROCEDURE — 94060 EVALUATION OF WHEEZING: CPT

## 2021-09-29 RX ORDER — PREDNISONE 20 MG/1
20 TABLET ORAL
Qty: 5 | Refills: 0 | Status: DISCONTINUED | COMMUNITY
Start: 2021-07-09 | End: 2021-09-29

## 2021-09-29 NOTE — DATA REVIEWED
[FreeTextEntry1] : A pulmonary function test is performed. The lung volumes reveal a normal total lung capacity. The vital capacity is mildly reduced. The residual volume and FRC are supernormal. Lung mechanics reveal a significant decrease in flow rates with slight bronchodilator reactivity demonstrated. The DLCO is significantly reduced. The saturation is 88%, but increased up to 96% after rest. This represents a significant degree of obstructive lung disease. Minimal airway reactivity is demonstrated. The constellation of the marked reduction in flow rates, he can't run for a lung fields, and the decrease in DLCO, is consistent with anatomic emphysema.\par \par CAT scan of the chest, performed on 8/26/21 is reviewed. There are no significant changes, when compared to the prior CAT scan of 2019.

## 2021-09-29 NOTE — HISTORY OF PRESENT ILLNESS
[FreeTextEntry1] : The patient comes in today for a routine followup evaluation.\par  [de-identified] : The patient states, overall, he is relatively stable. He remains compliant with his bronchodilator regimen. He continues to take albuterol tablets twice a day. He notes that he restarted his Mucinex recently, due to the fact that he had increased chest secretions, that he was unable to expectorate. After taking the Mucinex, the mucus expectoration was facilitated. He does complain of slight shortness of breath with exertional type activities. He is compliant with his metered dose inhalers. There is no chest pain.\par \par The patient did undergo a followup surveillance CAT scan of the chest. Was noted to have primarily chronic changes. The study in 2021, was compared to the last study performed in 2019. The radiologist, recommended one more followup in a year.\par \par The patient denies any reflux symptoms at present. He is compliant with omeprazole. He was recently seen by his urologist. 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 He now comes in for this assessment.

## 2021-09-29 NOTE — PHYSICAL EXAM
[No Acute Distress] : no acute distress [Well Developed] : well developed [Normal Sclera/Conjunctiva] : normal sclera/conjunctiva [No JVD] : no jugular venous distention [Supple] : supple [No Respiratory Distress] : no respiratory distress  [Clear to Auscultation] : lungs were clear to auscultation bilaterally [No Accessory Muscle Use] : no accessory muscle use [Normal Rate] : normal rate  [Regular Rhythm] : with a regular rhythm [Normal S1, S2] : normal S1 and S2 [No Edema] : there was no peripheral edema [No Extremity Clubbing/Cyanosis] : no extremity clubbing/cyanosis [Soft] : abdomen soft [Non Tender] : non-tender [No HSM] : no HSM [Normal Bowel Sounds] : normal bowel sounds [Normal Supraclavicular Nodes] : no supraclavicular lymphadenopathy [Normal Posterior Cervical Nodes] : no posterior cervical lymphadenopathy [Normal Anterior Cervical Nodes] : no anterior cervical lymphadenopathy [No Spinal Tenderness] : no spinal tenderness [Kyphosis] : kyphosis [Scoliosis] : no scoliosis [No Rash] : no rash [Normal Affect] : the affect was normal [Alert and Oriented x3] : oriented to person, place, and time [Normal Insight/Judgement] : insight and judgment were intact [de-identified] : Hoarse voice, thin and chronically ill appearing. [de-identified] : no stridor or mass. [de-identified] : diffusely diminished and hyperresonant. No wheeze or crackles. [de-identified] : 1/6 SAUMYA and occ extrasystole [de-identified] : multiple ecchymoses

## 2021-09-29 NOTE — PLAN
[FreeTextEntry1] : 1. Continued medications as outlined above.\par \par 2. The patient will undergo one more followup CAT scan of the chest in August of 2022 to ensure that the above-noted changes, which appear chronic, did not change.\par \par 3. The patient will attempt to obtain an inspiratory muscle  to help strengthen his respiratory muscles.\par \par 4. Follow up with myself in 6 months with a wellness evaluation and yearly routine fasting blood work.\par \par 5. The booster for the corona virus vaccination has been recommended in 6 months.

## 2022-03-22 ENCOUNTER — NON-APPOINTMENT (OUTPATIENT)
Age: 82
End: 2022-03-22

## 2022-03-22 DIAGNOSIS — R82.90 UNSPECIFIED ABNORMAL FINDINGS IN URINE: ICD-10-CM

## 2022-03-30 LAB
APPEARANCE: CLEAR
BACTERIA UR CULT: NORMAL
BACTERIA: NEGATIVE
BILIRUBIN URINE: NEGATIVE
BLOOD URINE: NEGATIVE
COLOR: NORMAL
GLUCOSE QUALITATIVE U: NEGATIVE
HYALINE CASTS: 0 /LPF
KETONES URINE: NEGATIVE
LEUKOCYTE ESTERASE URINE: ABNORMAL
MICROSCOPIC-UA: NORMAL
NITRITE URINE: NEGATIVE
PH URINE: 7.5
PROTEIN URINE: NORMAL
RED BLOOD CELLS URINE: 6 /HPF
SPECIFIC GRAVITY URINE: 1.03
SQUAMOUS EPITHELIAL CELLS: 0 /HPF
UROBILINOGEN URINE: NORMAL
WHITE BLOOD CELLS URINE: 10 /HPF

## 2022-03-31 ENCOUNTER — APPOINTMENT (OUTPATIENT)
Dept: INTERNAL MEDICINE | Facility: CLINIC | Age: 82
End: 2022-03-31
Payer: MEDICARE

## 2022-03-31 VITALS
WEIGHT: 137 LBS | DIASTOLIC BLOOD PRESSURE: 82 MMHG | HEIGHT: 66 IN | BODY MASS INDEX: 22.02 KG/M2 | OXYGEN SATURATION: 94 % | HEART RATE: 83 BPM | RESPIRATION RATE: 16 BRPM | TEMPERATURE: 98.2 F | SYSTOLIC BLOOD PRESSURE: 172 MMHG

## 2022-03-31 PROCEDURE — G0439: CPT

## 2022-03-31 NOTE — PLAN
[FreeTextEntry1] : One. Continue medication as outlined above.\par \par 2. I've asked patient to monitor his blood pressure at home on a daily basis, and maintain a log. His blood pressure is elevated today and will need to be addressed.\par \par 3. Patient will followup in one month with Ms. Corea to evaluate his blood pressure log. If his pressure remains elevated at that visit, we will then need to institute antihypertensive therapy.\par \par 4. The patient undergo repeat CAT scan of the chest in August to reevaluate the previous documented ill-defined patchy density in the right lower lung zone, as well as the bullous changes noted.\par \par 5. Follow up with myself in 6 months with full pulmonary function testing and EKG. We will review the followup CAT scan of the chest at that time.

## 2022-03-31 NOTE — HISTORY OF PRESENT ILLNESS
[FreeTextEntry1] : The patient comes in for a yearly wellness evaluation\par  [de-identified] : The patient states, that this time, he is relatively stable. She remains compliant with his medical regimen as outlined. He denies having any progression with regards to his baseline degree of breathlessness. He does not perform a formal type of exercise, but he does remain active performing work around his house. His appetite is good. His weight has been stable in the range between 137 and 142 pounds, for the past 4 years. He denies any change in bowel habits.\par \par The patient denies any dysuria. He has had issues with pyuria in the past, but his culture is usually negative. He is followed by urology. He is followed closely by cardiology as well. There are no acute issues at this time. She comes in for this assessment.

## 2022-03-31 NOTE — PHYSICAL EXAM
[No Acute Distress] : no acute distress [Well Developed] : well developed [Normal Sclera/Conjunctiva] : normal sclera/conjunctiva [PERRL] : pupils equal round and reactive to light [Normal Oropharynx] : the oropharynx was normal [No JVD] : no jugular venous distention [Supple] : supple [Thyroid Normal, No Nodules] : the thyroid was normal and there were no nodules present [No Respiratory Distress] : no respiratory distress  [Clear to Auscultation] : lungs were clear to auscultation bilaterally [No Accessory Muscle Use] : no accessory muscle use [Normal Rate] : normal rate  [Regular Rhythm] : with a regular rhythm [Normal S1, S2] : normal S1 and S2 [No Edema] : there was no peripheral edema [No Extremity Clubbing/Cyanosis] : no extremity clubbing/cyanosis [Soft] : abdomen soft [Non Tender] : non-tender [No HSM] : no HSM [Normal Bowel Sounds] : normal bowel sounds [Normal Supraclavicular Nodes] : no supraclavicular lymphadenopathy [Normal Posterior Cervical Nodes] : no posterior cervical lymphadenopathy [Normal Anterior Cervical Nodes] : no anterior cervical lymphadenopathy [No Spinal Tenderness] : no spinal tenderness [Kyphosis] : kyphosis [No Joint Swelling] : no joint swelling [Grossly Normal Strength/Tone] : grossly normal strength/tone [No Rash] : no rash [Normal Gait] : normal gait [Coordination Grossly Intact] : coordination grossly intact [No Focal Deficits] : no focal deficits [Normal Affect] : the affect was normal [Alert and Oriented x3] : oriented to person, place, and time [Normal Insight/Judgement] : insight and judgment were intact [Scoliosis] : no scoliosis [de-identified] : Hoarse voice, thin and chronically ill appearing. [de-identified] : non obstructing cerumen right AC [de-identified] : no stridor or mass. [de-identified] : diffusely diminished and hyperresonant. No wheeze or crackles. [de-identified] : 1/6 SAUMYA and occ extrasystole [FreeTextEntry1] : as per urology [de-identified] : as per urology [de-identified] : multiple ecchymoses

## 2022-04-28 ENCOUNTER — APPOINTMENT (OUTPATIENT)
Dept: INTERNAL MEDICINE | Facility: CLINIC | Age: 82
End: 2022-04-28
Payer: MEDICARE

## 2022-04-28 VITALS — DIASTOLIC BLOOD PRESSURE: 86 MMHG | SYSTOLIC BLOOD PRESSURE: 172 MMHG

## 2022-04-28 VITALS
HEART RATE: 77 BPM | HEIGHT: 66 IN | RESPIRATION RATE: 16 BRPM | BODY MASS INDEX: 22.66 KG/M2 | OXYGEN SATURATION: 95 % | TEMPERATURE: 98.6 F | WEIGHT: 141 LBS

## 2022-04-28 VITALS — SYSTOLIC BLOOD PRESSURE: 170 MMHG | DIASTOLIC BLOOD PRESSURE: 90 MMHG

## 2022-04-28 PROCEDURE — 99213 OFFICE O/P EST LOW 20 MIN: CPT

## 2022-04-28 NOTE — HISTORY OF PRESENT ILLNESS
[FreeTextEntry1] : elevated BP [de-identified] : BP elevated at visit 4 weeks ago and FU advised for recheck.  He has been monitoring BP daily with readings 115- 140's/65-83  Pt overall stable.  No complaints.

## 2022-04-28 NOTE — PLAN
[FreeTextEntry1] : \par \par To start Losartan 50 mg qd.  \par Low salt diet.\par Monitor BP at home and bring machine to next visit.  \par Recheck here 1 month.  \par Call any concerns.  \par

## 2022-04-28 NOTE — HISTORY OF PRESENT ILLNESS
[FreeTextEntry1] : elevated BP [de-identified] : BP elevated at visit 4 weeks ago and FU advised for recheck.  He has been monitoring BP daily with readings 115- 140's/65-83  Pt overall stable.  No complaints.
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
## 2022-04-28 NOTE — PHYSICAL EXAM
[Supple] : supple [Regular Rhythm] : with a regular rhythm [Normal S1, S2] : normal S1 and S2 [No Edema] : there was no peripheral edema [Normal] : affect was normal and insight and judgment were intact [de-identified] : alert, chronically ill appearing,frail  [de-identified] : diminished BS BL

## 2022-05-26 ENCOUNTER — APPOINTMENT (OUTPATIENT)
Dept: INTERNAL MEDICINE | Facility: CLINIC | Age: 82
End: 2022-05-26
Payer: MEDICARE

## 2022-05-26 VITALS
RESPIRATION RATE: 18 BRPM | SYSTOLIC BLOOD PRESSURE: 162 MMHG | BODY MASS INDEX: 22.5 KG/M2 | TEMPERATURE: 98.1 F | WEIGHT: 140 LBS | DIASTOLIC BLOOD PRESSURE: 74 MMHG | OXYGEN SATURATION: 92 % | HEART RATE: 70 BPM | HEIGHT: 66 IN

## 2022-05-26 PROCEDURE — 99213 OFFICE O/P EST LOW 20 MIN: CPT

## 2022-05-26 NOTE — HISTORY OF PRESENT ILLNESS
[FreeTextEntry1] : FU BP [de-identified] : Started on Losartan 50 mg 4 weeks ago.  Home monitoring shows reading range 119-130's/60-70  with rare 140/75.  Tolerating meds without lightheadedness, chest pain, SOB.   \par Home BP monitor correlates with office readings.

## 2022-05-26 NOTE — PHYSICAL EXAM
[Supple] : supple [Regular Rhythm] : with a regular rhythm [Normal S1, S2] : normal S1 and S2 [No Edema] : there was no peripheral edema [Normal] : affect was normal and insight and judgment were intact [de-identified] : alert, chronically ill appearing,frail  [de-identified] : diminished BS BL

## 2022-08-19 ENCOUNTER — OUTPATIENT (OUTPATIENT)
Dept: OUTPATIENT SERVICES | Facility: HOSPITAL | Age: 82
LOS: 1 days | End: 2022-08-19
Payer: MEDICARE

## 2022-08-19 ENCOUNTER — APPOINTMENT (OUTPATIENT)
Dept: CT IMAGING | Facility: CLINIC | Age: 82
End: 2022-08-19

## 2022-08-19 DIAGNOSIS — J43.9 EMPHYSEMA, UNSPECIFIED: ICD-10-CM

## 2022-08-19 PROCEDURE — 71250 CT THORAX DX C-: CPT | Mod: 26,ME

## 2022-08-19 PROCEDURE — G1004: CPT

## 2022-08-19 PROCEDURE — 71250 CT THORAX DX C-: CPT | Mod: ME

## 2022-08-30 ENCOUNTER — NON-APPOINTMENT (OUTPATIENT)
Age: 82
End: 2022-08-30

## 2022-10-21 ENCOUNTER — APPOINTMENT (OUTPATIENT)
Dept: INTERNAL MEDICINE | Facility: CLINIC | Age: 82
End: 2022-10-21

## 2022-10-21 ENCOUNTER — NON-APPOINTMENT (OUTPATIENT)
Age: 82
End: 2022-10-21

## 2022-10-21 VITALS
RESPIRATION RATE: 18 BRPM | HEART RATE: 64 BPM | WEIGHT: 138 LBS | OXYGEN SATURATION: 95 % | DIASTOLIC BLOOD PRESSURE: 78 MMHG | BODY MASS INDEX: 22.18 KG/M2 | SYSTOLIC BLOOD PRESSURE: 158 MMHG | TEMPERATURE: 98.5 F | HEIGHT: 66 IN

## 2022-10-21 DIAGNOSIS — I10 ESSENTIAL (PRIMARY) HYPERTENSION: ICD-10-CM

## 2022-10-21 DIAGNOSIS — K21.9 GASTRO-ESOPHAGEAL REFLUX DISEASE W/OUT ESOPHAGITIS: ICD-10-CM

## 2022-10-21 DIAGNOSIS — R97.20 ELEVATED PROSTATE, SPECIFIC ANTIGEN [PSA]: ICD-10-CM

## 2022-10-21 PROCEDURE — 94727 GAS DIL/WSHOT DETER LNG VOL: CPT

## 2022-10-21 PROCEDURE — 99214 OFFICE O/P EST MOD 30 MIN: CPT | Mod: 25

## 2022-10-21 PROCEDURE — ZZZZZ: CPT

## 2022-10-21 PROCEDURE — 94729 DIFFUSING CAPACITY: CPT

## 2022-10-21 PROCEDURE — 94060 EVALUATION OF WHEEZING: CPT

## 2022-10-21 RX ORDER — TADALAFIL 10 MG/1
10 TABLET, FILM COATED ORAL
Qty: 6 | Refills: 5 | Status: DISCONTINUED | COMMUNITY
End: 2022-10-21

## 2022-10-21 NOTE — PLAN
[FreeTextEntry1] : 1.  Continue with medication as outlined above.\par \par 2.  I referred the patient to the [facility] [place] for cardiac follow-up.  He has not seen his prior cardiologist, Dr. Ortiz since he retired several years ago.  He has been referred to Dr. Hooper.\par \par 3.  COVID booster will be obtained in the next week\par \par 4.  Follow-up with myself in 6 months with a wellness evaluation and routine fasting blood work.

## 2022-10-21 NOTE — DATA REVIEWED
[FreeTextEntry1] : A pulmonary function test is performed.  Lung volumes reveal a mild decrease in the total lung capacity and vital capacity.  The RV and FRC are normal.  Lung mechanics reveal a marked decrease in flow rates with negligible bronchodilator reactivity demonstrated.  The DLCO is significantly reduced.  The saturation is 95% on room air.  This represents a marked degree of obstruction.  The constellation of the marked reduction in flow rates, the minimal degree of bronchodilator reactivity, and the decrease in the DLCO, is consistent with anatomic emphysema.\par \par EKG shows sinus rhythm at a rate of 64.  There are normal intervals and axis.  There is early repolarization noted.  There are no acute ST-T wave changes seen.

## 2022-10-21 NOTE — HISTORY OF PRESENT ILLNESS
[FreeTextEntry1] : The patient comes in today for a routine followup evaluation.\par  [de-identified] : Patient states, that overall, he is doing relatively well.  He is somewhat compliant with his medications as prescribed.  He is not totally compliant with his Incruse inhaler on a daily basis.  There are days that he skips it.  He does have significant amounts of sputum however.  He does take Mucinex which helps to loosen the secretions.  He denies any pleuritic chest pain or hemoptysis.  He did undergo a follow-up CAT scan of the chest, to reevaluate the previously documented right lower lobe patchy opacity.  No new findings were noted.\par \par The patient states that he is compliant with his losartan on a daily basis.  He notes, that he was told that he had whitecoat syndrome.  He does monitor his blood pressure at home, and the systolic ranges between 120 and 130, and the diastolic between 76 and 82.  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  He comes in for this assessment.

## 2022-10-21 NOTE — PHYSICAL EXAM
[No Acute Distress] : no acute distress [Well Developed] : well developed [Normal Sclera/Conjunctiva] : normal sclera/conjunctiva [No JVD] : no jugular venous distention [Supple] : supple [No Respiratory Distress] : no respiratory distress  [Clear to Auscultation] : lungs were clear to auscultation bilaterally [No Accessory Muscle Use] : no accessory muscle use [Normal Rate] : normal rate  [Regular Rhythm] : with a regular rhythm [Normal S1, S2] : normal S1 and S2 [No Edema] : there was no peripheral edema [No Extremity Clubbing/Cyanosis] : no extremity clubbing/cyanosis [Soft] : abdomen soft [Non Tender] : non-tender [No HSM] : no HSM [Normal Bowel Sounds] : normal bowel sounds [Normal Supraclavicular Nodes] : no supraclavicular lymphadenopathy [Normal Posterior Cervical Nodes] : no posterior cervical lymphadenopathy [Normal Anterior Cervical Nodes] : no anterior cervical lymphadenopathy [No Spinal Tenderness] : no spinal tenderness [Kyphosis] : kyphosis [Scoliosis] : no scoliosis [No Rash] : no rash [Normal Affect] : the affect was normal [Alert and Oriented x3] : oriented to person, place, and time [Normal Insight/Judgement] : insight and judgment were intact [de-identified] : Hoarse voice, thin and chronically ill appearing. [de-identified] : no stridor or mass. [de-identified] : diffusely diminished and hyperresonant. No wheeze or crackles. [de-identified] : 1/6 SAUMYA and occ extrasystole [de-identified] : multiple ecchymoses

## 2023-04-25 ENCOUNTER — APPOINTMENT (OUTPATIENT)
Dept: INTERNAL MEDICINE | Facility: CLINIC | Age: 83
End: 2023-04-25
Payer: MEDICARE

## 2023-04-25 VITALS
OXYGEN SATURATION: 93 % | RESPIRATION RATE: 16 BRPM | BODY MASS INDEX: 20.95 KG/M2 | TEMPERATURE: 98.6 F | HEART RATE: 83 BPM | HEIGHT: 66 IN | WEIGHT: 130.38 LBS | DIASTOLIC BLOOD PRESSURE: 80 MMHG | SYSTOLIC BLOOD PRESSURE: 150 MMHG

## 2023-04-25 DIAGNOSIS — Z00.00 ENCOUNTER FOR GENERAL ADULT MEDICAL EXAMINATION W/OUT ABNORMAL FINDINGS: ICD-10-CM

## 2023-04-25 DIAGNOSIS — R63.4 ABNORMAL WEIGHT LOSS: ICD-10-CM

## 2023-04-25 PROCEDURE — 99214 OFFICE O/P EST MOD 30 MIN: CPT | Mod: 25

## 2023-04-25 PROCEDURE — G0439: CPT

## 2023-04-25 NOTE — PHYSICAL EXAM
[No Acute Distress] : no acute distress [Well Developed] : well developed [Normal Sclera/Conjunctiva] : normal sclera/conjunctiva [PERRL] : pupils equal round and reactive to light [Normal Oropharynx] : the oropharynx was normal [No JVD] : no jugular venous distention [Supple] : supple [Thyroid Normal, No Nodules] : the thyroid was normal and there were no nodules present [No Respiratory Distress] : no respiratory distress  [Clear to Auscultation] : lungs were clear to auscultation bilaterally [No Accessory Muscle Use] : no accessory muscle use [Normal Rate] : normal rate  [Regular Rhythm] : with a regular rhythm [Normal S1, S2] : normal S1 and S2 [No Edema] : there was no peripheral edema [No Extremity Clubbing/Cyanosis] : no extremity clubbing/cyanosis [Soft] : abdomen soft [Non Tender] : non-tender [No HSM] : no HSM [Normal Bowel Sounds] : normal bowel sounds [Normal Supraclavicular Nodes] : no supraclavicular lymphadenopathy [Normal Posterior Cervical Nodes] : no posterior cervical lymphadenopathy [Normal Anterior Cervical Nodes] : no anterior cervical lymphadenopathy [No Spinal Tenderness] : no spinal tenderness [Kyphosis] : kyphosis [No Joint Swelling] : no joint swelling [Grossly Normal Strength/Tone] : grossly normal strength/tone [No Rash] : no rash [Normal Gait] : normal gait [Coordination Grossly Intact] : coordination grossly intact [No Focal Deficits] : no focal deficits [Normal Affect] : the affect was normal [Alert and Oriented x3] : oriented to person, place, and time [Normal Insight/Judgement] : insight and judgment were intact [Scoliosis] : no scoliosis [de-identified] : Hoarse voice, thin and chronically ill appearing. [de-identified] : non obstructing cerumen right AC [de-identified] : no stridor or mass. [de-identified] : diffusely diminished and hyperresonant. No wheeze or crackles. [de-identified] : 1/6 SAUMYA and occ extrasystole [FreeTextEntry1] : as per urology [de-identified] : as per urology [de-identified] : multiple ecchymoses

## 2023-04-25 NOTE — PLAN
[FreeTextEntry1] : 1. Continue current medications as previously outlined. \par \par 2. Patient will follow-up with urology. \par \par 3. Patient will undergo a CAT scan with contrast of the abdomen and the pelvis to look for cause of loss of appetite and weight loss.  If the study is unremarkable, will consider a GI evaluation.\par \par 4. Patient will undergo a CAT scan of the chest in August of this year. \par \par 5. Patient will start drinking either 2 containers of Boost or Ensure per day, in an effort to increase his weight.  His weight loss may be due to increased work of breathing due to his severe emphysema.\par \par 6. Follow-up in 6 months with full pulmonary function testing, and EKG.

## 2023-05-01 ENCOUNTER — APPOINTMENT (OUTPATIENT)
Dept: CT IMAGING | Facility: CLINIC | Age: 83
End: 2023-05-01

## 2023-05-01 ENCOUNTER — OUTPATIENT (OUTPATIENT)
Dept: OUTPATIENT SERVICES | Facility: HOSPITAL | Age: 83
LOS: 1 days | End: 2023-05-01
Payer: MEDICARE

## 2023-05-01 DIAGNOSIS — J43.9 EMPHYSEMA, UNSPECIFIED: ICD-10-CM

## 2023-05-01 DIAGNOSIS — R63.4 ABNORMAL WEIGHT LOSS: ICD-10-CM

## 2023-05-01 PROCEDURE — 74177 CT ABD & PELVIS W/CONTRAST: CPT

## 2023-05-01 PROCEDURE — 74177 CT ABD & PELVIS W/CONTRAST: CPT | Mod: 26,MH

## 2023-05-03 ENCOUNTER — NON-APPOINTMENT (OUTPATIENT)
Age: 83
End: 2023-05-03

## 2023-08-30 ENCOUNTER — APPOINTMENT (OUTPATIENT)
Dept: CT IMAGING | Facility: CLINIC | Age: 83
End: 2023-08-30

## 2023-08-30 ENCOUNTER — RESULT REVIEW (OUTPATIENT)
Age: 83
End: 2023-08-30

## 2023-08-30 ENCOUNTER — APPOINTMENT (OUTPATIENT)
Dept: CT IMAGING | Facility: CLINIC | Age: 83
End: 2023-08-30
Payer: MEDICARE

## 2023-08-30 ENCOUNTER — OUTPATIENT (OUTPATIENT)
Dept: OUTPATIENT SERVICES | Facility: HOSPITAL | Age: 83
LOS: 1 days | End: 2023-08-30
Payer: MEDICARE

## 2023-08-30 DIAGNOSIS — J44.9 CHRONIC OBSTRUCTIVE PULMONARY DISEASE, UNSPECIFIED: ICD-10-CM

## 2023-08-30 DIAGNOSIS — J43.9 EMPHYSEMA, UNSPECIFIED: ICD-10-CM

## 2023-08-30 PROCEDURE — 71250 CT THORAX DX C-: CPT | Mod: MH

## 2023-08-30 PROCEDURE — 71250 CT THORAX DX C-: CPT | Mod: 26,MH

## 2023-09-14 DIAGNOSIS — Z87.891 PERSONAL HISTORY OF NICOTINE DEPENDENCE: ICD-10-CM

## 2023-09-15 RX ORDER — DOXYCYCLINE 100 MG/1
100 CAPSULE ORAL
Qty: 20 | Refills: 0 | Status: DISCONTINUED | COMMUNITY
Start: 2023-09-15 | End: 2023-09-15

## 2023-10-25 ENCOUNTER — OUTPATIENT (OUTPATIENT)
Dept: OUTPATIENT SERVICES | Facility: HOSPITAL | Age: 83
LOS: 1 days | End: 2023-10-25
Payer: MEDICARE

## 2023-10-25 ENCOUNTER — APPOINTMENT (OUTPATIENT)
Dept: CT IMAGING | Facility: CLINIC | Age: 83
End: 2023-10-25
Payer: MEDICARE

## 2023-10-25 DIAGNOSIS — R91.1 SOLITARY PULMONARY NODULE: ICD-10-CM

## 2023-10-25 PROCEDURE — 71250 CT THORAX DX C-: CPT

## 2023-10-25 PROCEDURE — 71250 CT THORAX DX C-: CPT | Mod: 26,MH

## 2023-11-07 RX ORDER — ATORVASTATIN CALCIUM 20 MG/1
20 TABLET, FILM COATED ORAL
Qty: 90 | Refills: 3 | Status: ACTIVE | COMMUNITY
Start: 2020-07-20 | End: 1900-01-01

## 2023-11-17 ENCOUNTER — APPOINTMENT (OUTPATIENT)
Dept: INTERNAL MEDICINE | Facility: CLINIC | Age: 83
End: 2023-11-17
Payer: MEDICARE

## 2023-11-17 VITALS
RESPIRATION RATE: 18 BRPM | DIASTOLIC BLOOD PRESSURE: 75 MMHG | OXYGEN SATURATION: 95 % | HEIGHT: 66 IN | SYSTOLIC BLOOD PRESSURE: 160 MMHG | HEART RATE: 60 BPM | TEMPERATURE: 98 F | WEIGHT: 133 LBS | BODY MASS INDEX: 21.38 KG/M2

## 2023-11-17 DIAGNOSIS — Z00.00 ENCOUNTER FOR GENERAL ADULT MEDICAL EXAMINATION W/OUT ABNORMAL FINDINGS: ICD-10-CM

## 2023-11-17 DIAGNOSIS — E78.5 HYPERLIPIDEMIA, UNSPECIFIED: ICD-10-CM

## 2023-11-17 DIAGNOSIS — I10 ESSENTIAL (PRIMARY) HYPERTENSION: ICD-10-CM

## 2023-11-17 DIAGNOSIS — J43.9 EMPHYSEMA, UNSPECIFIED: ICD-10-CM

## 2023-11-17 DIAGNOSIS — Z23 ENCOUNTER FOR IMMUNIZATION: ICD-10-CM

## 2023-11-17 DIAGNOSIS — C76.0 MALIGNANT NEOPLASM OF HEAD, FACE AND NECK: ICD-10-CM

## 2023-11-17 PROCEDURE — 94729 DIFFUSING CAPACITY: CPT

## 2023-11-17 PROCEDURE — ZZZZZ: CPT

## 2023-11-17 PROCEDURE — 99214 OFFICE O/P EST MOD 30 MIN: CPT | Mod: 25

## 2023-11-17 PROCEDURE — 94060 EVALUATION OF WHEEZING: CPT

## 2023-11-17 PROCEDURE — 94727 GAS DIL/WSHOT DETER LNG VOL: CPT

## 2024-01-05 ENCOUNTER — RX RENEWAL (OUTPATIENT)
Age: 84
End: 2024-01-05

## 2024-01-05 RX ORDER — LOSARTAN POTASSIUM 50 MG/1
50 TABLET, FILM COATED ORAL
Qty: 90 | Refills: 3 | Status: ACTIVE | COMMUNITY
Start: 2022-04-28 | End: 1900-01-01

## 2024-01-22 ENCOUNTER — RESULT REVIEW (OUTPATIENT)
Age: 84
End: 2024-01-22

## 2024-01-22 ENCOUNTER — APPOINTMENT (OUTPATIENT)
Dept: NUCLEAR MEDICINE | Facility: CLINIC | Age: 84
End: 2024-01-22
Payer: MEDICARE

## 2024-01-22 PROCEDURE — 78815 PET IMAGE W/CT SKULL-THIGH: CPT | Mod: PI,MH

## 2024-01-22 PROCEDURE — A9552: CPT

## 2024-02-02 ENCOUNTER — NON-APPOINTMENT (OUTPATIENT)
Age: 84
End: 2024-02-02

## 2024-02-08 ENCOUNTER — APPOINTMENT (OUTPATIENT)
Dept: PULMONOLOGY | Facility: CLINIC | Age: 84
End: 2024-02-08
Payer: MEDICARE

## 2024-02-08 VITALS
DIASTOLIC BLOOD PRESSURE: 78 MMHG | HEART RATE: 62 BPM | SYSTOLIC BLOOD PRESSURE: 102 MMHG | BODY MASS INDEX: 20.89 KG/M2 | HEIGHT: 66 IN | WEIGHT: 130 LBS | TEMPERATURE: 98.7 F | OXYGEN SATURATION: 98 %

## 2024-02-08 PROCEDURE — 99205 OFFICE O/P NEW HI 60 MIN: CPT

## 2024-02-08 RX ORDER — ALBUTEROL SULFATE 90 UG/1
108 (90 BASE) INHALANT RESPIRATORY (INHALATION) EVERY 6 HOURS
Qty: 3 | Refills: 1 | Status: DISCONTINUED | COMMUNITY
Start: 2019-07-10 | End: 2024-02-08

## 2024-02-08 RX ORDER — ASPIRIN 81 MG/1
81 TABLET ORAL DAILY
Qty: 100 | Refills: 0 | Status: DISCONTINUED | COMMUNITY
End: 2024-02-08

## 2024-02-08 NOTE — PROCEDURE
[FreeTextEntry1] : Pulmonary function test performed November 17, 2023 severe obstructive airways disease noted.  No bronchodilator response noted.  PET scan performed February 2, 2024 patient with stable postsurgical changes right upper lobe.  Nodule seen left upper lobe measures 0.8 cm was 0.9 cm on recent CAT scan.  SUV 1.5.

## 2024-02-08 NOTE — DISCUSSION/SUMMARY
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  He is content with his current medicine but I favor upon a trial of Trelegy as he might find this gives him a little more exercise ability.  We will hold the Incruse and Pulmicort and start Trelegy 100 mg 1 spray daily.  I reviewed the PET scan with the patient.  Left upper lobe nodule is very nonspecific has a decrease in size from prior study and is an SUV of 1.5.  I think this is unlikely to be a carcinoma.  I favor a follow-up CAT scan in 6 months to check on its status.  This been explained to the patient he understands and agrees. The patient understands and agrees with plan of care. Today's office visit encompassed 57 minutes. I conducted an extensive history,physical exam and reviewed diagnosis and treatment options including diagnostic tests,radiology studies including cat scans and the use of prescription medication.

## 2024-02-08 NOTE — PHYSICAL EXAM
[No Acute Distress] : no acute distress [Normal Oropharynx] : normal oropharynx [Normal Appearance] : normal appearance [No Neck Mass] : no neck mass [Normal Rate/Rhythm] : normal rate/rhythm [Normal S1, S2] : normal s1, s2 [No Murmurs] : no murmurs [No Resp Distress] : no resp distress [No Abnormalities] : no abnormalities [Benign] : benign [Normal Gait] : normal gait [No Clubbing] : no clubbing [No Cyanosis] : no cyanosis [No Edema] : no edema [FROM] : FROM [Normal Color/ Pigmentation] : normal color/ pigmentation [No Focal Deficits] : no focal deficits [Oriented x3] : oriented x3 [Normal Affect] : normal affect [TextBox_68] : Very poor absent breath sounds all areas

## 2024-02-08 NOTE — HISTORY OF PRESENT ILLNESS
[Former] : former [Never] : never [TextBox_4] : 83 male fabian tobacco 23 yr ago 1 ppd x 40 yrs Hx [facility] Presents today because hx copd and lung nodule sl dec and prior pet scan 1.5 suv today feels good  +  dyspnea sl cough and phlegm no chest pain no tightness +ambulate  maybe sl worse over past year no nite awakening retired   and   does not use meds regularly  [TextBox_11] : 1 [TextBox_13] : 40 [YearQuit] : 2000

## 2024-02-08 NOTE — REASON FOR VISIT
[Initial] : an initial visit [Asthma] : asthma [Emphysema] : emphysema [Shortness of Breath] : shortness of breath [Wheezing] : wheezing [Spouse] : spouse [TextBox_44] : Patient has since a past pulmonologist in [place] with [facility]. Want to establish new care with doctor. Patient complains of wheezing and SOB.

## 2024-03-12 ENCOUNTER — APPOINTMENT (OUTPATIENT)
Dept: PULMONOLOGY | Facility: CLINIC | Age: 84
End: 2024-03-12
Payer: MEDICARE

## 2024-03-12 VITALS
OXYGEN SATURATION: 91 % | TEMPERATURE: 97.9 F | DIASTOLIC BLOOD PRESSURE: 80 MMHG | HEART RATE: 57 BPM | SYSTOLIC BLOOD PRESSURE: 138 MMHG | WEIGHT: 130 LBS | HEIGHT: 66 IN | BODY MASS INDEX: 20.89 KG/M2

## 2024-03-12 DIAGNOSIS — Z20.822 CONTACT WITH AND (SUSPECTED) EXPOSURE TO COVID-19: ICD-10-CM

## 2024-03-12 PROCEDURE — 99214 OFFICE O/P EST MOD 30 MIN: CPT

## 2024-03-12 RX ORDER — DOXYCYCLINE 100 MG/1
100 TABLET, FILM COATED ORAL TWICE DAILY
Qty: 28 | Refills: 0 | Status: DISCONTINUED | COMMUNITY
Start: 2023-09-15 | End: 2024-03-12

## 2024-03-12 NOTE — DISCUSSION/SUMMARY
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  He seems very confused about what medicines he is taking or not taking.  He took the Trelegy and states it did not lead to any significant improvement.  I have asked him to stop all inhalers I will start him on budesonide 1 mg via nebulizer twice a day and formoterol 1 nebulizer twice a day The patient understands and agrees with plan of care. Today's office visit encompassed 32 minutes. I conducted an extensive history, physical exam and reviewed diagnosis and treatment options including diagnostic tests,radiology studies including cat scans and the use of prescription medication.

## 2024-03-12 NOTE — PHYSICAL EXAM
[No Acute Distress] : no acute distress [Normal Oropharynx] : normal oropharynx [Normal Appearance] : normal appearance [No Neck Mass] : no neck mass [Normal S1, S2] : normal s1, s2 [Normal Rate/Rhythm] : normal rate/rhythm [No Murmurs] : no murmurs [No Resp Distress] : no resp distress [Clear to Auscultation Bilaterally] : clear to auscultation bilaterally [No Abnormalities] : no abnormalities [Benign] : benign [Normal Gait] : normal gait [No Clubbing] : no clubbing [No Cyanosis] : no cyanosis [No Edema] : no edema [FROM] : FROM [Normal Color/ Pigmentation] : normal color/ pigmentation [No Focal Deficits] : no focal deficits [Normal Affect] : normal affect [Oriented x3] : oriented x3 [TextBox_68] : poor absent aeration all areas

## 2024-03-12 NOTE — HISTORY OF PRESENT ILLNESS
[Former] : former [Never] : never [TextBox_4] : 83 male hx copd and lung nodule started on trelegy last visit feels poor  no change with trelegy  no chest pain + wheeze  all cough and phlegm  [TextBox_11] : 1 [TextBox_13] : 40 [YearQuit] : 2000

## 2024-03-12 NOTE — REASON FOR VISIT
[Follow-Up] : a follow-up visit [Asthma] : asthma [Emphysema] : emphysema [COPD] : COPD [Shortness of Breath] : shortness of breath [Wheezing] : wheezing [Pulmonary Nodules] : pulmonary nodules [TextEntry] : Patient states he is still experiencing SOB.  Patient did try the Trelegy but did not see a difference.  Patient is confused what medications he should be taking and which ones he should stop.

## 2024-04-02 ENCOUNTER — APPOINTMENT (OUTPATIENT)
Dept: PULMONOLOGY | Facility: CLINIC | Age: 84
End: 2024-04-02
Payer: MEDICARE

## 2024-04-02 VITALS
DIASTOLIC BLOOD PRESSURE: 60 MMHG | TEMPERATURE: 97.9 F | OXYGEN SATURATION: 95 % | HEIGHT: 66 IN | SYSTOLIC BLOOD PRESSURE: 140 MMHG | BODY MASS INDEX: 20.73 KG/M2 | HEART RATE: 69 BPM | WEIGHT: 129 LBS

## 2024-04-02 PROCEDURE — 99214 OFFICE O/P EST MOD 30 MIN: CPT

## 2024-04-02 RX ORDER — ALBUTEROL SULFATE 90 UG/1
108 (90 BASE) INHALANT RESPIRATORY (INHALATION) EVERY 4 HOURS
Qty: 1 | Refills: 6 | Status: ACTIVE | COMMUNITY
Start: 2024-04-02 | End: 1900-01-01

## 2024-04-02 RX ORDER — OMEPRAZOLE 20 MG/1
20 CAPSULE, DELAYED RELEASE ORAL
Qty: 90 | Refills: 3 | Status: ACTIVE | COMMUNITY
Start: 1900-01-01 | End: 1900-01-01

## 2024-04-02 RX ORDER — FLUTICASONE FUROATE, UMECLIDINIUM BROMIDE AND VILANTEROL TRIFENATATE 200; 62.5; 25 UG/1; UG/1; UG/1
200-62.5-25 POWDER RESPIRATORY (INHALATION)
Qty: 90 | Refills: 3 | Status: ACTIVE | COMMUNITY
Start: 2024-04-02 | End: 1900-01-01

## 2024-04-02 NOTE — DISCUSSION/SUMMARY
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  Multiple medicines have been tried and nothing improves her symptoms.  He thinks the Trelegy might have been better than the budesonide Brovana combination.  We will restart Trelegy 100 mg 1 spray daily.  The patient has significant inflammatory sounds on his exam.  I have asked him to start prednisone 10 mg daily.  We will repeat a CAT scan chest in June 2024 regarding a lung nodule.  Will obtain pulmonary function test on his next visit. The patient understands and agrees with plan of care. Today's office visit encompassed 32 minutes. I conducted an extensive history, physical exam and reviewed diagnosis and treatment options including diagnostic tests,radiology studies including cat scans and the use of prescription medication.

## 2024-04-02 NOTE — REASON FOR VISIT
[Cough] : cough [Follow-Up] : a follow-up visit [COPD] : COPD [Emphysema] : emphysema [Shortness of Breath] : shortness of breath [Pulmonary Nodules] : pulmonary nodules [TextEntry] : 3 weeks. SOB with minimal activity and a wet cough. Patient states he does not feel the inhaler or nebulizer solution has helped, but he states he has had a cold for about 2 weeks.

## 2024-04-02 NOTE — PHYSICAL EXAM
[No Acute Distress] : no acute distress [Normal Oropharynx] : normal oropharynx [No Neck Mass] : no neck mass [Normal Appearance] : normal appearance [Normal Rate/Rhythm] : normal rate/rhythm [Normal S1, S2] : normal s1, s2 [No Murmurs] : no murmurs [No Resp Distress] : no resp distress [No Abnormalities] : no abnormalities [Benign] : benign [Normal Gait] : normal gait [No Clubbing] : no clubbing [No Cyanosis] : no cyanosis [No Edema] : no edema [FROM] : FROM [Normal Color/ Pigmentation] : normal color/ pigmentation [No Focal Deficits] : no focal deficits [Oriented x3] : oriented x3 [Normal Affect] : normal affect [TextBox_68] : Scattered rhonchi and pops sounds in the lungs [TextBox_2] : Thin male no respiratory distress

## 2024-04-02 NOTE — HISTORY OF PRESENT ILLNESS
[Former] : former [Never] : never [TextBox_4] : 83male  copd  started on fomoterol and budesonide   and no improvement  inc phlegm no fever no wheeze  no chest pain no tightness no improvement with trelegy [TextBox_11] : 1 [TextBox_13] : 40 [YearQuit] : 2000

## 2024-04-22 ENCOUNTER — APPOINTMENT (OUTPATIENT)
Dept: PULMONOLOGY | Facility: CLINIC | Age: 84
End: 2024-04-22
Payer: MEDICARE

## 2024-04-22 VITALS
SYSTOLIC BLOOD PRESSURE: 140 MMHG | HEART RATE: 75 BPM | DIASTOLIC BLOOD PRESSURE: 60 MMHG | HEIGHT: 66 IN | WEIGHT: 140 LBS | TEMPERATURE: 98.7 F | BODY MASS INDEX: 22.5 KG/M2 | OXYGEN SATURATION: 92 %

## 2024-04-22 PROCEDURE — 99214 OFFICE O/P EST MOD 30 MIN: CPT

## 2024-04-22 NOTE — REASON FOR VISIT
[Follow-Up] : a follow-up visit [Cough] : cough [COPD] : COPD [Emphysema] : emphysema [Pulmonary Nodules] : pulmonary nodules [Wheezing] : wheezing [TextEntry] : 2 weeks. Patient currently has a wet cough for about 4-5 weeks and wheezing.

## 2024-04-22 NOTE — HISTORY OF PRESENT ILLNESS
[Former] : former [Never] : never [TextBox_4] : 83  male hx copd  for fu re  shortness of breath  today feels congestion  no chest pain no tightness no wheeze  sl improved on prednisone and using trelegy 1 qd  no orthopnea but ++cough at nite  [TextBox_11] : 1 [TextBox_13] : 40 [YearQuit] : 2000

## 2024-04-22 NOTE — PHYSICAL EXAM
[No Acute Distress] : no acute distress [Normal Oropharynx] : normal oropharynx [Normal Appearance] : normal appearance [No Neck Mass] : no neck mass [Normal Rate/Rhythm] : normal rate/rhythm [Normal S1, S2] : normal s1, s2 [No Murmurs] : no murmurs [No Resp Distress] : no resp distress [Clear to Auscultation Bilaterally] : clear to auscultation bilaterally [Rhonchi] : rhonchi [No Abnormalities] : no abnormalities [Benign] : benign [Normal Gait] : normal gait [No Clubbing] : no clubbing [No Cyanosis] : no cyanosis [No Edema] : no edema [FROM] : FROM [Normal Color/ Pigmentation] : normal color/ pigmentation [No Focal Deficits] : no focal deficits [Oriented x3] : oriented x3 [Normal Affect] : normal affect [TextBox_68] : Good aeration slight rhonchi bilaterally

## 2024-04-22 NOTE — DISCUSSION/SUMMARY
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  He noticed minimal improvement on the prednisone.  He does have a significant chronic cough.  We will decrease the prednisone to 5 mg daily.  I have given him Zithromax to see if this will decrease the sputum production if there is any bacteria present.  The patient return in approximately 2 weeks time to check on his progress. The patient understands and agrees with plan of care. Today's office visit encompassed 32 minutes. I conducted an extensive history, physical exam and reviewed diagnosis and treatment options including diagnostic tests,radiology studies including cat scans and the use of prescription medication.

## 2024-05-06 ENCOUNTER — APPOINTMENT (OUTPATIENT)
Dept: PULMONOLOGY | Facility: CLINIC | Age: 84
End: 2024-05-06
Payer: MEDICARE

## 2024-05-06 VITALS
HEART RATE: 80 BPM | BODY MASS INDEX: 20.09 KG/M2 | WEIGHT: 125 LBS | TEMPERATURE: 97.7 F | DIASTOLIC BLOOD PRESSURE: 62 MMHG | OXYGEN SATURATION: 92 % | HEIGHT: 66 IN | SYSTOLIC BLOOD PRESSURE: 122 MMHG

## 2024-05-06 PROCEDURE — 99214 OFFICE O/P EST MOD 30 MIN: CPT

## 2024-05-06 RX ORDER — PREDNISONE 5 MG/1
5 TABLET ORAL
Refills: 0 | Status: ACTIVE | COMMUNITY

## 2024-05-06 RX ORDER — AZITHROMYCIN 250 MG/1
250 TABLET, FILM COATED ORAL
Qty: 6 | Refills: 0 | Status: DISCONTINUED | COMMUNITY
Start: 2024-04-22 | End: 2024-05-06

## 2024-05-06 RX ORDER — BUDESONIDE 180 UG/1
180 AEROSOL, POWDER RESPIRATORY (INHALATION)
Qty: 3 | Refills: 1 | Status: DISCONTINUED | COMMUNITY
End: 2024-05-06

## 2024-05-06 RX ORDER — BUDESONIDE 1 MG/2ML
1 INHALANT ORAL
Qty: 6 | Refills: 3 | Status: DISCONTINUED | COMMUNITY
Start: 2024-03-12 | End: 2024-05-06

## 2024-05-06 RX ORDER — PREDNISONE 5 MG/1
5 TABLET ORAL
Qty: 60 | Refills: 3 | Status: DISCONTINUED | COMMUNITY
Start: 2024-04-02 | End: 2024-05-06

## 2024-05-06 NOTE — DISCUSSION/SUMMARY
[FreeTextEntry1] : Mr. Mercedes is COPD.  He is generally stable on the current therapy.  He continues to complain of daily sputum.  We will continue all medicine and start Mucinex DM 1 tablet twice a day.  I have asked him to obtain a sputum culture for AFB fungus and C&S. The patient understands and agrees with plan of care. Today's office visit encompassed 32 minutes. I conducted an extensive history, physical exam and reviewed diagnosis and treatment options including diagnostic tests,radiology studies including cat scans and the use of prescription medication.

## 2024-05-06 NOTE — HISTORY OF PRESENT ILLNESS
[Former] : former [Never] : never [TextBox_4] : 83 male hx copd  last visit  dec steroids and zpak ++sputum  every am  color grey  no blood  no chest pain no tightness full activity [TextBox_11] : 1 [TextBox_13] : 40 [YearQuit] : 2000

## 2024-05-06 NOTE — PHYSICAL EXAM
[No Acute Distress] : no acute distress [Normal Oropharynx] : normal oropharynx [Normal Appearance] : normal appearance [No Neck Mass] : no neck mass [Normal Rate/Rhythm] : normal rate/rhythm [Normal S1, S2] : normal s1, s2 [No Murmurs] : no murmurs [No Resp Distress] : no resp distress [Rhonchi] : rhonchi [No Abnormalities] : no abnormalities [Benign] : benign [Normal Gait] : normal gait [No Clubbing] : no clubbing [No Cyanosis] : no cyanosis [No Edema] : no edema [FROM] : FROM [Normal Color/ Pigmentation] : normal color/ pigmentation [No Focal Deficits] : no focal deficits [Oriented x3] : oriented x3 [Normal Affect] : normal affect [TextBox_2] : Thin male no distress [TextBox_68] : Good aeration decreased slight rhonchi bilateral

## 2024-05-06 NOTE — REASON FOR VISIT
[Follow-Up] : a follow-up visit [Cough] : cough [COPD] : COPD [Emphysema] : emphysema [Shortness of Breath] : shortness of breath [Pulmonary Nodules] : pulmonary nodules [Wheezing] : wheezing [TextEntry] : Patient states he has seen only a slight difference.

## 2024-05-12 ENCOUNTER — NON-APPOINTMENT (OUTPATIENT)
Age: 84
End: 2024-05-12

## 2024-05-14 ENCOUNTER — APPOINTMENT (OUTPATIENT)
Dept: PULMONOLOGY | Facility: CLINIC | Age: 84
End: 2024-05-14
Payer: MEDICARE

## 2024-05-14 VITALS
HEIGHT: 66 IN | WEIGHT: 126 LBS | BODY MASS INDEX: 20.25 KG/M2 | HEART RATE: 75 BPM | TEMPERATURE: 98.9 F | DIASTOLIC BLOOD PRESSURE: 70 MMHG | OXYGEN SATURATION: 92 % | SYSTOLIC BLOOD PRESSURE: 120 MMHG

## 2024-05-14 DIAGNOSIS — R91.1 SOLITARY PULMONARY NODULE: ICD-10-CM

## 2024-05-14 DIAGNOSIS — J18.9 PNEUMONIA, UNSPECIFIED ORGANISM: ICD-10-CM

## 2024-05-14 PROCEDURE — 99215 OFFICE O/P EST HI 40 MIN: CPT

## 2024-05-14 RX ORDER — LEVOFLOXACIN 500 MG/1
500 TABLET, FILM COATED ORAL DAILY
Qty: 14 | Refills: 1 | Status: ACTIVE | COMMUNITY
Start: 2024-05-14 | End: 1900-01-01

## 2024-05-14 NOTE — HISTORY OF PRESENT ILLNESS
[Former] : former [Never] : never [TextBox_4] : 83 male hx copd  and ch bronchitis did not bring in sputum cx to lab mucous white to brown has lost weight    20 lb since off steroids  cxr yesterday at clinic  emphysema  and ?pneumonia today feels good at rest  dyspnea on exertion  [TextBox_11] : 1 [TextBox_13] : 40 [YearQuit] : 2000

## 2024-05-14 NOTE — PROCEDURE
[FreeTextEntry1] : Chest x-ray walk-in clinic severe emphysema increased markings at bases likely fibrotic right upper lobe suture line status post lobectomy years ago

## 2024-05-14 NOTE — DISCUSSION/SUMMARY
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  He has a chronic constant sputum production white to brown.  He was asked to get sputum cultures last visit and he did not.  We will continue all therapy at this time.  I have asked him once again to get sputum cultures  He has a history of right upper lobe lobectomy for benign process.  His prior CAT scan showed nodules that were followed by other pulmonary physicians and felt to be benign as a waxed and waned in size.  We repeat a CAT scan at this time as the patient is having weight loss per his daughter.  Will obtain sputum cultures.  Once these results are available further recommendations will be made the patient has thrush on exam.  This is likely from the Trelegy and prednisone.  He is not rinsing his mouth thoroughly and I discussed he must do same.  Will start nystatin swish and swallow.

## 2024-05-14 NOTE — PHYSICAL EXAM
[No Acute Distress] : no acute distress [Normal Appearance] : normal appearance [No Neck Mass] : no neck mass [Normal Rate/Rhythm] : normal rate/rhythm [Normal S1, S2] : normal s1, s2 [No Murmurs] : no murmurs [No Resp Distress] : no resp distress [Clear to Auscultation Bilaterally] : clear to auscultation bilaterally [No Abnormalities] : no abnormalities [Benign] : benign [Normal Gait] : normal gait [No Cyanosis] : no cyanosis [No Edema] : no edema [FROM] : FROM [Normal Color/ Pigmentation] : normal color/ pigmentation [No Focal Deficits] : no focal deficits [Oriented x3] : oriented x3 [Normal Affect] : normal affect [TextBox_11] : Thrush [TextBox_68] : Very poor aeration all areas

## 2024-05-14 NOTE — REASON FOR VISIT
[Follow-Up] : a follow-up visit [Chest Pain] : chest pain [Cough] : cough [COPD] : COPD [Emphysema] : emphysema [Shortness of Breath] : shortness of breath [Pulmonary Nodules] : pulmonary nodules [Wheezing] : wheezing [Family Member] : family member [Other: _____] : [unfilled] [TextEntry] : 1 month. Patient complaint of chest pain, coughing, sob and wheezing.

## 2024-05-17 ENCOUNTER — NON-APPOINTMENT (OUTPATIENT)
Age: 84
End: 2024-05-17

## 2024-05-23 ENCOUNTER — NON-APPOINTMENT (OUTPATIENT)
Age: 84
End: 2024-05-23

## 2024-05-28 ENCOUNTER — APPOINTMENT (OUTPATIENT)
Dept: PULMONOLOGY | Facility: CLINIC | Age: 84
End: 2024-05-28
Payer: MEDICARE

## 2024-05-28 VITALS
BODY MASS INDEX: 18.66 KG/M2 | WEIGHT: 126 LBS | TEMPERATURE: 98.3 F | DIASTOLIC BLOOD PRESSURE: 50 MMHG | HEIGHT: 69 IN | OXYGEN SATURATION: 94 % | HEART RATE: 60 BPM | SYSTOLIC BLOOD PRESSURE: 110 MMHG

## 2024-05-28 DIAGNOSIS — R91.8 OTHER NONSPECIFIC ABNORMAL FINDING OF LUNG FIELD: ICD-10-CM

## 2024-05-28 PROCEDURE — 99215 OFFICE O/P EST HI 40 MIN: CPT

## 2024-05-28 RX ORDER — NYSTATIN 100000 [USP'U]/ML
100000 SUSPENSION ORAL
Qty: 125 | Refills: 1 | Status: DISCONTINUED | COMMUNITY
Start: 2024-05-14 | End: 2024-05-28

## 2024-05-28 RX ORDER — THEOPHYLLINE 400 MG/1
400 TABLET, EXTENDED RELEASE ORAL
Qty: 30 | Refills: 3 | Status: ACTIVE | COMMUNITY
Start: 2024-05-28 | End: 1900-01-01

## 2024-05-28 NOTE — REASON FOR VISIT
[Follow-Up] : a follow-up visit [Cough] : cough [COPD] : COPD [Emphysema] : emphysema [Shortness of Breath] : shortness of breath [Pulmonary Nodules] : pulmonary nodules [Wheezing] : wheezing [Family Member] : family member [TextEntry] : 2 weeks. Labs and CT performed. Patient has SOB with activity, wheezing and a productive cough that has gotten better.

## 2024-05-28 NOTE — DISCUSSION/SUMMARY
[FreeTextEntry1] : Mr. Johnston has severe dyspnea on exertion and COPD.  His oxygen desaturates into the 70s with exertion but required 4 L to bring it above 90.  I favor 4 L oxygen with exercise and sleep.  Patient's CAT scan showed dense lower lobe pneumonias.  I favor follow-up CAT scan in about 6 weeks time to check for resolution.  I do not think further antibiotics are needed.  We will stop his prednisone as he is seeing no improvement with this.  I have asked him to start theophylline 400 mg daily.  This been discussed with the patient and his daughter and all are in agreement The patient understands and agrees with the plan of care. Today's office visit encompassed 42 minutes. I conducted an extensive history, physical exam and reviewed diagnosis and treatment options including diagnostic tests radiology studies including cat scans and the use of prescription medication.

## 2024-05-28 NOTE — PHYSICAL EXAM
[No Acute Distress] : no acute distress [Normal Oropharynx] : normal oropharynx [Normal Appearance] : normal appearance [No Neck Mass] : no neck mass [Normal Rate/Rhythm] : normal rate/rhythm [Normal S1, S2] : normal s1, s2 [No Murmurs] : no murmurs [No Resp Distress] : no resp distress [Clear to Auscultation Bilaterally] : clear to auscultation bilaterally [No Abnormalities] : no abnormalities [Benign] : benign [Normal Gait] : normal gait [No Clubbing] : no clubbing [No Cyanosis] : no cyanosis [No Edema] : no edema [FROM] : FROM [Normal Color/ Pigmentation] : normal color/ pigmentation [No Focal Deficits] : no focal deficits [Oriented x3] : oriented x3 [Normal Affect] : normal affect [TextBox_68] : Very poor aeration but clear all areas

## 2024-05-28 NOTE — HISTORY OF PRESENT ILLNESS
[Former] : former [Never] : never [TextBox_4] : 83 male hx severe copd  co severe sob with exertion  ++wheeze  no chest pain no tightness no nite awakening on prednisone 10 mg qd  [TextBox_11] : 1 [TextBox_13] : 40 [YearQuit] : 2000

## 2024-05-28 NOTE — PROCEDURE
[FreeTextEntry1] : Room air O2 saturation 99% at rest.  With exercise patient decreased to 79%.  Required 4 L to get oxygen above 90%.

## 2024-06-11 ENCOUNTER — APPOINTMENT (OUTPATIENT)
Dept: INTERNAL MEDICINE | Facility: CLINIC | Age: 84
End: 2024-06-11

## 2024-06-17 ENCOUNTER — APPOINTMENT (OUTPATIENT)
Dept: PULMONOLOGY | Facility: CLINIC | Age: 84
End: 2024-06-17
Payer: MEDICARE

## 2024-06-17 VITALS
BODY MASS INDEX: 18.66 KG/M2 | OXYGEN SATURATION: 97 % | TEMPERATURE: 98.2 F | HEIGHT: 69 IN | WEIGHT: 126 LBS | SYSTOLIC BLOOD PRESSURE: 124 MMHG | HEART RATE: 64 BPM | DIASTOLIC BLOOD PRESSURE: 86 MMHG

## 2024-06-17 DIAGNOSIS — R06.09 OTHER FORMS OF DYSPNEA: ICD-10-CM

## 2024-06-17 DIAGNOSIS — J42 UNSPECIFIED CHRONIC BRONCHITIS: ICD-10-CM

## 2024-06-17 DIAGNOSIS — J44.9 CHRONIC OBSTRUCTIVE PULMONARY DISEASE, UNSPECIFIED: ICD-10-CM

## 2024-06-17 DIAGNOSIS — R09.02 HYPOXEMIA: ICD-10-CM

## 2024-06-17 PROCEDURE — 99214 OFFICE O/P EST MOD 30 MIN: CPT

## 2024-06-17 RX ORDER — THIAMINE HCL 50 MG
TABLET ORAL
Refills: 0 | Status: ACTIVE | COMMUNITY

## 2024-06-17 NOTE — PHYSICAL EXAM
[No Acute Distress] : no acute distress [Normal Oropharynx] : normal oropharynx [Normal Appearance] : normal appearance [No Neck Mass] : no neck mass [Normal Rate/Rhythm] : normal rate/rhythm [Normal S1, S2] : normal s1, s2 [No Murmurs] : no murmurs [No Resp Distress] : no resp distress [Clear to Auscultation Bilaterally] : clear to auscultation bilaterally [No Abnormalities] : no abnormalities [Benign] : benign [Normal Gait] : normal gait [No Clubbing] : no clubbing [No Cyanosis] : no cyanosis [No Edema] : no edema [FROM] : FROM [Normal Color/ Pigmentation] : normal color/ pigmentation [No Focal Deficits] : no focal deficits [Oriented x3] : oriented x3 [Normal Affect] : normal affect [TextBox_2] : Thin male no distress [TextBox_68] : Poor aeration with slight rhonchi at bases

## 2024-06-17 NOTE — HISTORY OF PRESENT ILLNESS
[Former] : former [Never] : never [Continuous] : Continuous [NC] : Nasal Cannula [PRN] : As needed [TextBox_4] : 83 male hx copd  today feels poor still iwht persistent cough no fever + decrease phlegm no blood  no cough with eating no sinus discomfort  stomach pain with cough [TextBox_11] : 1 [TextBox_13] : 40 [YearQuit] : 2000 [FreeTextEntry1] : 4

## 2024-06-17 NOTE — DISCUSSION/SUMMARY
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  He still has a persistent cough.  He completed antibiotics last visit.  Sputum cultures have all been nonrevealing.  The patient's prior CAT scan showed bibasilar consolidations.  I have asked him to repeat the CAT scan now as it has been approximately 8 weeks since his prior antibiotics and CAT scan.  If there is no change/improvement in the CAT scan neck step would be a bronchoscopy and biopsy to evaluate the anatomy.  This been discussed with the patient and his daughter and final decisions will be made after the next CAT scan.  Will continue all therapy at this time. The patient understands and agrees with plan of care. Today's office visit encompassed 32 minutes. I conducted an extensive history, physical exam and reviewed diagnosis and treatment options including diagnostic tests,radiology studies including cat scans and the use of prescription medication.

## 2024-06-17 NOTE — REASON FOR VISIT
[Follow-Up] : a follow-up visit [Cough] : cough [COPD] : COPD [Emphysema] : emphysema [Shortness of Breath] : shortness of breath [Pulmonary Nodules] : pulmonary nodules [TextEntry] : 3 week. Patient complaint of coughing up black phlegm, and sob.

## 2024-06-18 ENCOUNTER — NON-APPOINTMENT (OUTPATIENT)
Age: 84
End: 2024-06-18

## 2024-07-26 ENCOUNTER — APPOINTMENT (OUTPATIENT)
Dept: PULMONOLOGY | Facility: CLINIC | Age: 84
End: 2024-07-26
Payer: MEDICARE

## 2024-07-26 VITALS
HEART RATE: 96 BPM | BODY MASS INDEX: 16.29 KG/M2 | WEIGHT: 110 LBS | DIASTOLIC BLOOD PRESSURE: 68 MMHG | OXYGEN SATURATION: 96 % | TEMPERATURE: 96.7 F | SYSTOLIC BLOOD PRESSURE: 108 MMHG | HEIGHT: 69 IN

## 2024-07-26 DIAGNOSIS — R06.09 OTHER FORMS OF DYSPNEA: ICD-10-CM

## 2024-07-26 DIAGNOSIS — J44.9 CHRONIC OBSTRUCTIVE PULMONARY DISEASE, UNSPECIFIED: ICD-10-CM

## 2024-07-26 DIAGNOSIS — J43.9 EMPHYSEMA, UNSPECIFIED: ICD-10-CM

## 2024-07-26 DIAGNOSIS — R91.8 OTHER NONSPECIFIC ABNORMAL FINDING OF LUNG FIELD: ICD-10-CM

## 2024-07-26 DIAGNOSIS — J42 UNSPECIFIED CHRONIC BRONCHITIS: ICD-10-CM

## 2024-07-26 PROCEDURE — 99215 OFFICE O/P EST HI 40 MIN: CPT

## 2024-07-26 RX ORDER — FAMOTIDINE 10 MG/1
10 TABLET, FILM COATED ORAL
Refills: 0 | Status: ACTIVE | COMMUNITY

## 2024-07-26 RX ORDER — SUCRALFATE 1 G/10ML
1 SUSPENSION ORAL
Refills: 0 | Status: ACTIVE | COMMUNITY

## 2024-07-26 NOTE — HISTORY OF PRESENT ILLNESS
[Former] : former [Never] : never [Continuous] : Continuous [NC] : Nasal Cannula [PRN] : As needed [TextBox_4] : 84 male with ch cough and mucous [place] [facility] yesterday co gi distress /gerd and dysphagia  not relieved with eating  has lost 5 lbs  In ED told needed upper endo  but pt went home  [TextBox_11] : 1 [TextBox_13] : 40 [YearQuit] : 2000 [FreeTextEntry1] : 4

## 2024-07-26 NOTE — DISCUSSION/SUMMARY
[FreeTextEntry1] : Mr. Mercedes has a very complicated medical picture.  He recently was at [facility] for abdominal pain and CAT scan of the abdomen showed the lower lobes to have bilateral infiltrates.  I compared this to the prior CAT scan done June 2024 at  and in my view the infiltrates have markedly improved.  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  The patient had surgery in the right lung in the past.  Patient had a PET scan in January 2024 that showed uptake in the left upper lobe and uptake at the bases.  The uptake in the left upper lobe was low-grade SUV 1.5 it is likely all fibrotic in nature.  I think the lower lobes also more fibrotic.  He underwent a bronchoscopy at 1 point was nondiagnostic.  The patient has continued weight loss.  He now complains of abdominal pain.  He was on theophylline we will stop that medication.  He will be seeing a gastroenterologist for evaluation and possible upper endoscopy.  Based on these results repeat bronchoscopy could be undertaken although the patient is high risk because of his severe lung disease.  This been discussed with the patient and his daughter.  We will continue his Trelegy at this time. The patient understands and agrees with the plan of care. Today's office visit encompassed 42 minutes. I conducted an extensive history, physical exam and reviewed diagnosis and treatment options including diagnostic tests radiology studies including cat scans and the use of prescription medication.

## 2024-07-26 NOTE — PHYSICAL EXAM
[No Acute Distress] : no acute distress [Normal Oropharynx] : normal oropharynx [Normal Appearance] : normal appearance [No Neck Mass] : no neck mass [Normal Rate/Rhythm] : normal rate/rhythm [Normal S1, S2] : normal s1, s2 [No Murmurs] : no murmurs [No Resp Distress] : no resp distress [Clear to Auscultation Bilaterally] : clear to auscultation bilaterally [Rhonchi] : rhonchi [No Abnormalities] : no abnormalities [Benign] : benign [Normal Gait] : normal gait [No Clubbing] : no clubbing [No Cyanosis] : no cyanosis [No Edema] : no edema [FROM] : FROM [Normal Color/ Pigmentation] : normal color/ pigmentation [No Focal Deficits] : no focal deficits [Oriented x3] : oriented x3 [Normal Affect] : normal affect [TextBox_2] : Cachectic male coughing and congested no respiratory distress [TextBox_68] : Coarse rhonchi all areas

## 2024-07-26 NOTE — REASON FOR VISIT
[Follow-Up - From Hospitalization] : a follow-up visit after a recent hospitalization [Cough] : cough [COPD] : COPD [Pulmonary Nodules] : pulmonary nodules [TextEntry] : Patient went to hospital last night due to stomach issues.  Patient stopped taking trelegy for about 5 days due to stomach issues.

## 2024-08-05 ENCOUNTER — APPOINTMENT (OUTPATIENT)
Dept: PULMONOLOGY | Facility: CLINIC | Age: 84
End: 2024-08-05

## 2024-08-20 ENCOUNTER — NON-APPOINTMENT (OUTPATIENT)
Age: 84
End: 2024-08-20

## 2024-08-26 ENCOUNTER — NON-APPOINTMENT (OUTPATIENT)
Age: 84
End: 2024-08-26

## 2025-07-29 ENCOUNTER — APPOINTMENT (OUTPATIENT)
Dept: PULMONOLOGY | Facility: CLINIC | Age: 85
End: 2025-07-29
Payer: MEDICARE

## 2025-07-29 VITALS
HEART RATE: 51 BPM | SYSTOLIC BLOOD PRESSURE: 120 MMHG | HEIGHT: 68 IN | OXYGEN SATURATION: 84 % | TEMPERATURE: 98.1 F | WEIGHT: 116 LBS | DIASTOLIC BLOOD PRESSURE: 58 MMHG | BODY MASS INDEX: 17.58 KG/M2

## 2025-07-29 DIAGNOSIS — J44.9 CHRONIC OBSTRUCTIVE PULMONARY DISEASE, UNSPECIFIED: ICD-10-CM

## 2025-07-29 DIAGNOSIS — R91.8 OTHER NONSPECIFIC ABNORMAL FINDING OF LUNG FIELD: ICD-10-CM

## 2025-07-29 DIAGNOSIS — R09.02 HYPOXEMIA: ICD-10-CM

## 2025-07-29 PROCEDURE — ZZZZZ: CPT

## 2025-07-29 PROCEDURE — 94729 DIFFUSING CAPACITY: CPT

## 2025-07-29 PROCEDURE — 99214 OFFICE O/P EST MOD 30 MIN: CPT | Mod: 25

## 2025-07-29 PROCEDURE — 94010 BREATHING CAPACITY TEST: CPT

## 2025-07-29 PROCEDURE — 94727 GAS DIL/WSHOT DETER LNG VOL: CPT

## 2025-07-29 RX ORDER — DONEPEZIL HYDROCHLORIDE 23 MG/1
TABLET, FILM COATED ORAL
Refills: 0 | Status: ACTIVE | COMMUNITY

## 2025-07-29 RX ORDER — FLUTICASONE FUROATE, UMECLIDINIUM BROMIDE AND VILANTEROL TRIFENATATE 100; 62.5; 25 UG/1; UG/1; UG/1
100-62.5-25 POWDER RESPIRATORY (INHALATION)
Qty: 1 | Refills: 6 | Status: ACTIVE | COMMUNITY
Start: 2025-07-29 | End: 1900-01-01